# Patient Record
Sex: FEMALE | Race: WHITE | NOT HISPANIC OR LATINO | Employment: FULL TIME | URBAN - METROPOLITAN AREA
[De-identification: names, ages, dates, MRNs, and addresses within clinical notes are randomized per-mention and may not be internally consistent; named-entity substitution may affect disease eponyms.]

---

## 2023-05-04 LAB
CLUE CELLS: NORMAL
NUGENT SCORE: 1
YEAST: NORMAL

## 2023-06-12 LAB
ANION GAP IN SER/PLAS: 12 MMOL/L (ref 10–20)
CALCIUM (MG/DL) IN SER/PLAS: 8.9 MG/DL (ref 8.6–10.3)
CARBON DIOXIDE, TOTAL (MMOL/L) IN SER/PLAS: 25 MMOL/L (ref 21–32)
CHLORIDE (MMOL/L) IN SER/PLAS: 103 MMOL/L (ref 98–107)
CHORIOGONADOTROPIN (MIU/ML) IN SER/PLAS: <2 MIU/ML
CREATININE (MG/DL) IN SER/PLAS: 0.72 MG/DL (ref 0.5–1.05)
ERYTHROCYTE DISTRIBUTION WIDTH (RATIO) BY AUTOMATED COUNT: 11.3 % (ref 11.5–14.5)
ERYTHROCYTE MEAN CORPUSCULAR HEMOGLOBIN CONCENTRATION (G/DL) BY AUTOMATED: 34 G/DL (ref 32–36)
ERYTHROCYTE MEAN CORPUSCULAR VOLUME (FL) BY AUTOMATED COUNT: 81 FL (ref 80–100)
ERYTHROCYTES (10*6/UL) IN BLOOD BY AUTOMATED COUNT: 4.84 X10E12/L (ref 4–5.2)
GFR FEMALE: >90 ML/MIN/1.73M2
GLUCOSE (MG/DL) IN SER/PLAS: 93 MG/DL (ref 74–99)
HEMATOCRIT (%) IN BLOOD BY AUTOMATED COUNT: 39.4 % (ref 36–46)
HEMOGLOBIN (G/DL) IN BLOOD: 13.4 G/DL (ref 12–16)
INR IN PPP BY COAGULATION ASSAY: 1 (ref 0.9–1.1)
LEUKOCYTES (10*3/UL) IN BLOOD BY AUTOMATED COUNT: 7.5 X10E9/L (ref 4.4–11.3)
NRBC (PER 100 WBCS) BY AUTOMATED COUNT: 0 /100 WBC (ref 0–0)
PLATELETS (10*3/UL) IN BLOOD AUTOMATED COUNT: 299 X10E9/L (ref 150–450)
POTASSIUM (MMOL/L) IN SER/PLAS: 3.9 MMOL/L (ref 3.5–5.3)
PROTHROMBIN TIME (PT) IN PPP BY COAGULATION ASSAY: 11.5 SEC (ref 9.8–13.4)
SODIUM (MMOL/L) IN SER/PLAS: 136 MMOL/L (ref 136–145)
THYROTROPIN (MIU/L) IN SER/PLAS BY DETECTION LIMIT <= 0.05 MIU/L: <0.01 MIU/L (ref 0.44–3.98)
UREA NITROGEN (MG/DL) IN SER/PLAS: 15 MG/DL (ref 6–23)

## 2023-06-15 ENCOUNTER — HOSPITAL ENCOUNTER (OUTPATIENT)
Dept: DATA CONVERSION | Facility: HOSPITAL | Age: 28
End: 2023-06-16
Attending: INTERNAL MEDICINE | Admitting: INTERNAL MEDICINE
Payer: COMMERCIAL

## 2023-06-15 DIAGNOSIS — E66.3 OVERWEIGHT: ICD-10-CM

## 2023-06-15 DIAGNOSIS — I47.10 SUPRAVENTRICULAR TACHYCARDIA (CMS-HCC): ICD-10-CM

## 2023-06-15 DIAGNOSIS — I49.1 ATRIAL PREMATURE DEPOLARIZATION: ICD-10-CM

## 2023-06-15 DIAGNOSIS — E05.90 THYROTOXICOSIS, UNSPECIFIED WITHOUT THYROTOXIC CRISIS OR STORM: ICD-10-CM

## 2023-06-15 DIAGNOSIS — Z87.891 PERSONAL HISTORY OF NICOTINE DEPENDENCE: ICD-10-CM

## 2023-06-15 LAB
ATRIAL RATE: 88 BPM
P AXIS: 26 DEGREES
P OFFSET: 202 MS
P ONSET: 157 MS
PR INTERVAL: 94 MS
Q ONSET: 204 MS
QRS COUNT: 15 BEATS
QRS DURATION: 110 MS
QT INTERVAL: 400 MS
QTC CALCULATION(BAZETT): 484 MS
QTC FREDERICIA: 454 MS
R AXIS: 64 DEGREES
T AXIS: 24 DEGREES
T OFFSET: 404 MS
VENTRICULAR RATE: 88 BPM

## 2023-06-16 ENCOUNTER — TELEPHONE (OUTPATIENT)
Dept: PRIMARY CARE | Facility: CLINIC | Age: 28
End: 2023-06-16
Payer: COMMERCIAL

## 2023-06-20 PROBLEM — E05.90 HYPERTHYROIDISM: Status: ACTIVE | Noted: 2023-06-20

## 2023-06-20 PROBLEM — M54.50 LOWER BACK PAIN: Status: ACTIVE | Noted: 2023-06-20

## 2023-06-20 PROBLEM — E53.8 VITAMIN B 12 DEFICIENCY: Status: ACTIVE | Noted: 2023-06-20

## 2023-06-20 PROBLEM — R91.1 LUNG NODULE: Status: ACTIVE | Noted: 2023-06-20

## 2023-06-20 PROBLEM — N91.2 AMENORRHEA: Status: ACTIVE | Noted: 2023-06-20

## 2023-06-20 PROBLEM — E04.9 THYROID GOITER: Status: ACTIVE | Noted: 2023-06-20

## 2023-06-20 PROBLEM — B97.7 HPV IN FEMALE: Status: ACTIVE | Noted: 2023-06-20

## 2023-06-20 PROBLEM — I49.1 PAC (PREMATURE ATRIAL CONTRACTION): Status: ACTIVE | Noted: 2023-06-20

## 2023-06-20 PROBLEM — N89.8 VAGINAL ITCHING: Status: ACTIVE | Noted: 2023-06-20

## 2023-06-20 PROBLEM — M54.9 BACK PAIN WITHOUT RADICULOPATHY: Status: ACTIVE | Noted: 2023-06-20

## 2023-06-20 PROBLEM — E05.00 GRAVES' DISEASE IN REMISSION: Status: ACTIVE | Noted: 2023-06-20

## 2023-06-20 PROBLEM — F41.9 ANXIETY DISORDER: Status: ACTIVE | Noted: 2023-06-20

## 2023-06-20 PROBLEM — D64.9 ANEMIA: Status: ACTIVE | Noted: 2023-06-20

## 2023-06-20 PROBLEM — R94.31 ABNORMAL ELECTROCARDIOGRAPHY: Status: ACTIVE | Noted: 2023-06-20

## 2023-06-20 PROBLEM — S33.5XXA LUMBAR SPRAIN: Status: ACTIVE | Noted: 2023-06-20

## 2023-06-20 PROBLEM — R07.89 CHEST DISCOMFORT: Status: ACTIVE | Noted: 2023-06-20

## 2023-06-20 PROBLEM — R00.2 PALPITATIONS: Status: ACTIVE | Noted: 2023-06-20

## 2023-06-20 PROBLEM — I47.10 SUPRAVENTRICULAR TACHYCARDIA BY ECG (CMS-HCC): Status: ACTIVE | Noted: 2023-06-20

## 2023-06-20 PROBLEM — R87.619 ABNORMAL PAP SMEAR OF CERVIX: Status: ACTIVE | Noted: 2023-06-20

## 2023-06-20 PROBLEM — R87.622 LGSIL PAP SMEAR OF VAGINA: Status: ACTIVE | Noted: 2023-06-20

## 2023-06-20 PROBLEM — I47.10 PAROXYSMAL SVT (SUPRAVENTRICULAR TACHYCARDIA) (CMS-HCC): Status: ACTIVE | Noted: 2023-06-20

## 2023-06-20 PROBLEM — R06.02 SHORTNESS OF BREATH: Status: ACTIVE | Noted: 2023-06-20

## 2023-06-20 PROBLEM — E55.9 VITAMIN D DEFICIENCY: Status: ACTIVE | Noted: 2023-06-20

## 2023-06-20 PROBLEM — I47.11 INAPPROPRIATE SINUS TACHYCARDIA (CMS-HCC): Status: ACTIVE | Noted: 2023-06-20

## 2023-06-20 PROBLEM — E61.1 IRON DEFICIENCY: Status: ACTIVE | Noted: 2023-06-20

## 2023-06-20 PROBLEM — B00.1 COLD SORE: Status: ACTIVE | Noted: 2023-06-20

## 2023-06-20 PROBLEM — T14.8XXA HEMATOMA: Status: ACTIVE | Noted: 2023-06-20

## 2023-06-20 PROBLEM — R51.9 HEADACHE: Status: ACTIVE | Noted: 2023-06-20

## 2023-06-20 PROBLEM — S90.30XA FOOT CONTUSION: Status: ACTIVE | Noted: 2023-06-20

## 2023-06-20 PROBLEM — N76.0 VAGINITIS: Status: ACTIVE | Noted: 2023-06-20

## 2023-06-20 PROBLEM — E05.00 GRAVES' OPHTHALMOPATHY: Status: ACTIVE | Noted: 2023-06-20

## 2023-06-20 PROBLEM — H10.89 OTHER CONJUNCTIVITIS: Status: ACTIVE | Noted: 2023-06-20

## 2023-06-20 PROBLEM — N89.8 VAGINAL ODOR: Status: ACTIVE | Noted: 2023-06-20

## 2023-06-21 ENCOUNTER — LAB (OUTPATIENT)
Dept: LAB | Facility: LAB | Age: 28
End: 2023-06-21
Payer: COMMERCIAL

## 2023-06-21 ENCOUNTER — OFFICE VISIT (OUTPATIENT)
Dept: PRIMARY CARE | Facility: CLINIC | Age: 28
End: 2023-06-21
Payer: COMMERCIAL

## 2023-06-21 VITALS
WEIGHT: 171 LBS | OXYGEN SATURATION: 98 % | HEART RATE: 89 BPM | DIASTOLIC BLOOD PRESSURE: 85 MMHG | TEMPERATURE: 98.6 F | BODY MASS INDEX: 26 KG/M2 | SYSTOLIC BLOOD PRESSURE: 126 MMHG

## 2023-06-21 DIAGNOSIS — E61.1 IRON DEFICIENCY: ICD-10-CM

## 2023-06-21 DIAGNOSIS — E55.9 VITAMIN D DEFICIENCY: ICD-10-CM

## 2023-06-21 DIAGNOSIS — E05.00 GRAVES DISEASE: ICD-10-CM

## 2023-06-21 DIAGNOSIS — E05.00 GRAVES DISEASE: Primary | ICD-10-CM

## 2023-06-21 LAB
ALANINE AMINOTRANSFERASE (SGPT) (U/L) IN SER/PLAS: 8 U/L (ref 7–45)
ALBUMIN (G/DL) IN SER/PLAS: 4.2 G/DL (ref 3.4–5)
ALKALINE PHOSPHATASE (U/L) IN SER/PLAS: 49 U/L (ref 33–110)
ASPARTATE AMINOTRANSFERASE (SGOT) (U/L) IN SER/PLAS: 14 U/L (ref 9–39)
BILIRUBIN DIRECT (MG/DL) IN SER/PLAS: 0.1 MG/DL (ref 0–0.3)
BILIRUBIN TOTAL (MG/DL) IN SER/PLAS: 0.3 MG/DL (ref 0–1.2)
CALCIDIOL (25 OH VITAMIN D3) (NG/ML) IN SER/PLAS: 23 NG/ML
IRON (UG/DL) IN SER/PLAS: 41 UG/DL (ref 35–150)
IRON BINDING CAPACITY (UG/DL) IN SER/PLAS: 438 UG/DL (ref 240–445)
IRON SATURATION (%) IN SER/PLAS: 9 % (ref 25–45)
PROTEIN TOTAL: 7 G/DL (ref 6.4–8.2)
THYROTROPIN (MIU/L) IN SER/PLAS BY DETECTION LIMIT <= 0.05 MIU/L: 0.06 MIU/L (ref 0.44–3.98)
THYROXINE (T4) FREE (NG/DL) IN SER/PLAS: 0.59 NG/DL (ref 0.61–1.12)

## 2023-06-21 PROCEDURE — 82306 VITAMIN D 25 HYDROXY: CPT

## 2023-06-21 PROCEDURE — 84481 FREE ASSAY (FT-3): CPT

## 2023-06-21 PROCEDURE — 1036F TOBACCO NON-USER: CPT | Performed by: INTERNAL MEDICINE

## 2023-06-21 PROCEDURE — 83550 IRON BINDING TEST: CPT

## 2023-06-21 PROCEDURE — 80076 HEPATIC FUNCTION PANEL: CPT

## 2023-06-21 PROCEDURE — 3008F BODY MASS INDEX DOCD: CPT | Performed by: INTERNAL MEDICINE

## 2023-06-21 PROCEDURE — 84432 ASSAY OF THYROGLOBULIN: CPT

## 2023-06-21 PROCEDURE — 86800 THYROGLOBULIN ANTIBODY: CPT

## 2023-06-21 PROCEDURE — 84439 ASSAY OF FREE THYROXINE: CPT

## 2023-06-21 PROCEDURE — 83540 ASSAY OF IRON: CPT

## 2023-06-21 PROCEDURE — 82728 ASSAY OF FERRITIN: CPT

## 2023-06-21 PROCEDURE — 99214 OFFICE O/P EST MOD 30 MIN: CPT | Performed by: INTERNAL MEDICINE

## 2023-06-21 PROCEDURE — 84443 ASSAY THYROID STIM HORMONE: CPT

## 2023-06-21 PROCEDURE — 36415 COLL VENOUS BLD VENIPUNCTURE: CPT

## 2023-06-21 RX ORDER — DESOGESTREL AND ETHINYL ESTRADIOL 0.15-0.03
1 KIT ORAL DAILY
COMMUNITY
End: 2024-03-26 | Stop reason: WASHOUT

## 2023-06-21 RX ORDER — ACETAMINOPHEN 325 MG/1
1000 TABLET ORAL EVERY 8 HOURS PRN
COMMUNITY
Start: 2022-02-19 | End: 2023-11-28 | Stop reason: ALTCHOICE

## 2023-06-21 RX ORDER — ASPIRIN 81 MG/1
81 TABLET ORAL DAILY
COMMUNITY
End: 2023-11-28 | Stop reason: ALTCHOICE

## 2023-06-21 RX ORDER — METOPROLOL SUCCINATE 25 MG/1
1 TABLET, EXTENDED RELEASE ORAL DAILY
COMMUNITY
Start: 2022-04-01 | End: 2024-03-18 | Stop reason: SDUPTHER

## 2023-06-21 RX ORDER — LANOLIN ALCOHOL/MO/W.PET/CERES
1 CREAM (GRAM) TOPICAL DAILY
COMMUNITY
Start: 2021-12-07 | End: 2023-11-28 | Stop reason: SDUPTHER

## 2023-06-21 ASSESSMENT — PATIENT HEALTH QUESTIONNAIRE - PHQ9
2. FEELING DOWN, DEPRESSED OR HOPELESS: NOT AT ALL
SUM OF ALL RESPONSES TO PHQ9 QUESTIONS 1 AND 2: 0
1. LITTLE INTEREST OR PLEASURE IN DOING THINGS: NOT AT ALL

## 2023-06-21 NOTE — PROGRESS NOTES
Subjective   Patient ID: Mary Jackson is a 27 y.o. female who presents for Follow-up (Hosp follow up had ablation done/Concerned about TSH results/Not feeling any symptoms).  HPI  Ablation last week  Palpations  Hr 240  Last endo  Note reviewed  No cp now or sob  No more palpitations    Left upper lung nodule 3 years ago pt states she saw on an op report, we cannot find today,  last cxr ok   She will look let me know if she finds  Review of Systems  Gen:  no fever  HEENT:  no trouble swallowing  CV:  no dyspnea, cyanosis  Lungs:  no shortness of breath  GI:  no constipation, no blood in stool  Vascular:  no edema  Neuro:   no weakness  Skin:  no rash  MS:no joint swelling  Gu:  no urinary complaints  All other systems have been reviewed and are negative for complaint    /85   Pulse 89   Temp 37 °C (98.6 °F) (Temporal)   Wt 77.6 kg (171 lb)   SpO2 98%   BMI 26.00 kg/m²   Objective   Physical Exam  Lab Results   Component Value Date    WBC 7.5 06/12/2023    HGB 13.4 06/12/2023    HCT 39.4 06/12/2023    MCV 81 06/12/2023     06/12/2023     Lab Results   Component Value Date    GLUCOSE 93 06/12/2023    CALCIUM 8.9 06/12/2023     06/12/2023    K 3.9 06/12/2023    CO2 25 06/12/2023     06/12/2023    BUN 15 06/12/2023    CREATININE 0.72 06/12/2023     Social History     Socioeconomic History    Marital status: Single     Spouse name: None    Number of children: None    Years of education: None    Highest education level: None   Occupational History    None   Tobacco Use    Smoking status: Never    Smokeless tobacco: Never   Substance and Sexual Activity    Alcohol use: Yes    Drug use: None    Sexual activity: None   Other Topics Concern    None   Social History Narrative    None     Social Determinants of Health     Financial Resource Strain: Not on file   Food Insecurity: Not on file   Transportation Needs: Not on file   Physical Activity: Not on file   Stress: Not on file   Social  Connections: Not on file   Intimate Partner Violence: Not on file   Housing Stability: Not on file     No family history on file.      General Appearance:  Alert and oriented.  NAD  Lungs, CTAB  Skin:  no suspicious lesions,  warm and dry  Head :  Normocephalic  Neck/thyroid:  neck supple, full rom, no cervical lymphadenopathy  no thyromegaly  Heart:  RRR  no murmurs  Abdomen:  Normal , bs present, soft, nontender, not distended, no masses palpated  Extremities:  No clubbing, cyanosis, or edema  Neurologic:  Nonfocal  Psych: alert, normal mood    Problem List Items Addressed This Visit       Iron deficiency    Relevant Orders    Triiodothyronine, Free (Completed)    Thyroxine, Free (Completed)    Thyroid Stimulating Hormone (Completed)    Thyroglobulin and Antithyroglobulin    Vitamin D, Total (Completed)    Iron and TIBC (Completed)    Ferritin (Completed)    Hepatic Function Panel (Completed)    Vitamin D deficiency    Relevant Orders    Triiodothyronine, Free (Completed)    Thyroxine, Free (Completed)    Thyroid Stimulating Hormone (Completed)    Thyroglobulin and Antithyroglobulin    Vitamin D, Total (Completed)    Iron and TIBC (Completed)    Ferritin (Completed)    Hepatic Function Panel (Completed)     Other Visit Diagnoses       Graves disease    -  Primary    Relevant Orders    Triiodothyronine, Free (Completed)    Thyroxine, Free (Completed)    Thyroid Stimulating Hormone (Completed)    Thyroglobulin and Antithyroglobulin    Vitamin D, Total (Completed)    Iron and TIBC (Completed)    Ferritin (Completed)    Hepatic Function Panel (Completed)            Mary was seen today for follow-up.  Diagnoses and all orders for this visit:  Graves disease (Primary)  -     Triiodothyronine, Free; Future  -     Thyroxine, Free; Future  -     Thyroid Stimulating Hormone; Future  -     Thyroglobulin and Antithyroglobulin; Future  -     Vitamin D, Total; Future  -     Iron and TIBC; Future  -     Ferritin; Future  -      Hepatic Function Panel; Future  Vitamin D deficiency  -     Triiodothyronine, Free; Future  -     Thyroxine, Free; Future  -     Thyroid Stimulating Hormone; Future  -     Thyroglobulin and Antithyroglobulin; Future  -     Vitamin D, Total; Future  -     Iron and TIBC; Future  -     Ferritin; Future  -     Hepatic Function Panel; Future  Iron deficiency  -     Triiodothyronine, Free; Future  -     Thyroxine, Free; Future  -     Thyroid Stimulating Hormone; Future  -     Thyroglobulin and Antithyroglobulin; Future  -     Vitamin D, Total; Future  -     Iron and TIBC; Future  -     Ferritin; Future  -     Hepatic Function Panel; Future

## 2023-06-22 LAB
FERRITIN (UG/LL) IN SER/PLAS: 48 UG/L (ref 8–150)
POC ACTIVATED CLOTTING TIME LOW RANGE: 200 SECONDS (ref 89–169)
POC ACTIVATED CLOTTING TIME LOW RANGE: 226 SECONDS (ref 89–169)
POC ACTIVATED CLOTTING TIME LOW RANGE: 294 SECONDS (ref 89–169)
POC ACTIVATED CLOTTING TIME LOW RANGE: 300 SECONDS (ref 89–169)
TRIIODOTHYRONINE (T3) FREE (PG/ML) IN SER/PLAS: 2.8 PG/ML (ref 2.3–4.2)

## 2023-06-23 ENCOUNTER — TELEPHONE (OUTPATIENT)
Dept: PRIMARY CARE | Facility: CLINIC | Age: 28
End: 2023-06-23
Payer: COMMERCIAL

## 2023-06-26 DIAGNOSIS — E55.9 VITAMIN D DEFICIENCY: ICD-10-CM

## 2023-06-26 DIAGNOSIS — E61.1 IRON DEFICIENCY: ICD-10-CM

## 2023-06-27 LAB
THYROGLOBULIN AB (IU/ML) IN SER/PLAS: 59.9 IU/ML (ref 0–4)
THYROGLOBULIN LC-MS/MS: 2.1 NG/ML (ref 1.3–31.8)
THYROGLOBULIN: ABNORMAL NG/ML (ref 1.3–31.8)

## 2023-06-27 RX ORDER — ERGOCALCIFEROL 1.25 MG/1
50000 CAPSULE ORAL
Qty: 8 CAPSULE | Refills: 0 | Status: SHIPPED | OUTPATIENT
Start: 2023-06-27 | End: 2023-08-22

## 2023-07-25 DIAGNOSIS — T14.8XXA HEMATOMA: Primary | ICD-10-CM

## 2023-08-28 PROBLEM — S99.921A RIGHT FOOT INJURY, INITIAL ENCOUNTER: Status: ACTIVE | Noted: 2023-06-20

## 2023-08-28 RX ORDER — LANOLIN ALCOHOL/MO/W.PET/CERES
1 CREAM (GRAM) TOPICAL 2 TIMES DAILY
COMMUNITY
Start: 2021-12-07 | End: 2024-03-18 | Stop reason: SDUPTHER

## 2023-08-28 RX ORDER — METHIMAZOLE 10 MG/1
1 TABLET ORAL DAILY
COMMUNITY
Start: 2018-10-02 | End: 2023-11-28 | Stop reason: ALTCHOICE

## 2023-08-28 RX ORDER — BISACODYL 5 MG/1
TABLET, COATED ORAL EVERY 24 HOURS
COMMUNITY
Start: 2022-04-05 | End: 2023-11-28 | Stop reason: ALTCHOICE

## 2023-08-28 RX ORDER — METOPROLOL TARTRATE 25 MG/1
1 TABLET, FILM COATED ORAL 2 TIMES DAILY
COMMUNITY
Start: 2021-12-07

## 2023-08-28 RX ORDER — ASPIRIN 325 MG
TABLET, DELAYED RELEASE (ENTERIC COATED) ORAL
COMMUNITY
Start: 2020-09-10 | End: 2023-11-28 | Stop reason: ALTCHOICE

## 2023-09-07 VITALS — BODY MASS INDEX: 26.85 KG/M2 | HEIGHT: 67 IN | WEIGHT: 171.08 LBS

## 2023-09-08 PROBLEM — R10.30 GROIN PAIN: Status: ACTIVE | Noted: 2023-09-08

## 2023-09-08 PROBLEM — S30.1XXA HEMATOMA OF GROIN: Status: ACTIVE | Noted: 2023-09-08

## 2023-09-08 PROBLEM — R87.612 LGSIL ON PAP SMEAR OF CERVIX: Status: ACTIVE | Noted: 2023-09-08

## 2023-09-08 RX ORDER — SERTRALINE HYDROCHLORIDE 50 MG/1
1 TABLET, FILM COATED ORAL DAILY
COMMUNITY
Start: 2023-09-05 | End: 2023-12-08 | Stop reason: SDUPTHER

## 2023-09-30 NOTE — DISCHARGE SUMMARY
Send Summary:   Discharge Summary Providers:  Provider Role Provider Name   · Attending Mireille Montiel   · Referring Mireille Montiel   · Primary Gisela Bowman       Note Recipients: Gisela Bowman MD Quan, Kara, MD - [phone] [preferred]       Discharge:    Summary:   Admission Date: .15-Clayton-2023 10:55:00   Discharge Date: 16-Jun-2023   Attending Physician at Discharge: Mireille Montiel   Admission Reason: PSVT   Final Discharge Diagnoses: Paroxysmal supraventricular  tachycardia   Procedures: Date: 15-Clayton-2023 18:21:00  Procedure Name: Ablation of SVT x 2, Pacing after IV medication, 3D mapping, Arterial line,Vascular ultrasound guided access x 3   Condition at Discharge: Satisfactory   Disposition at Discharge: .Home   Vital Signs:        T   P  R  BP   MAP  SpO2   Value  36.5  97  16  128/77   97  97%  Date/Time 6/16 7:35 6/16 7:35 6/16 7:35 6/16 7:35  6/16 7:35 6/16 7:35  Range  (35.8C - 36.5C )  (86 - 98 )  (16 - 17 )  (122 - 128 )/ (77 - 91 )  (97 - 99 )  (96% - 97% )    Date:            Weight/Scale Type:  Height:   15-Clayton-2023 22:20  77.6  kg         170.1  cm  Hospital Course:    7-year-old female admitted to [facility] with PSVT for elective EP study possible ablation.  Patient underwent left-sided ablation  with 2 accessory pathways. See full operative report per Dr. Montiel.  Patient was monitored overnight secondary to anesthesia and length of procedure.  At this time patient is up out of bed ambulating in room.  She remains sinus rhythm on telemetry.  Right groin site dressing was removed no hematoma or bleeding at site.  Patient complains of minor discomfort at site, pain relief with Tylenol or tramadol.  Post procedure potential complication, activity restrictions, medications, and follow up reviewed with patient.  Aspirin 325 mg ordered daily x1 month.  Okay to discharge home.  Outpatient follow-up with Holter monitor in 8 weeks then Dr Mireille Montiel in  12  weeks.      Total visit time 25 minutes      Discharge Information:    and Continuing Care:   Lab Results - Pending:    None  Radiology Results - Pending: None   Discharge Instructions:    .  Follow Up Appointments:    Follow-Up Appointment 01:           Physician/Dept/Service:   [facility]          Reason for Referral:   Holter Monitor          Scheduled Date/Time:   11-Aug-2023 10:00          Location:   [facility]          Phone Number:   [phone]    Follow-Up Appointment 02:           Physician/Dept/Service:   Dr Montiel          Reason for Referral:   Follow-up/Holter Follow-up          Scheduled Date/Time:   18-Sep-2023 09:40          Location:   [facility]          Phone Number:   [phone]    Discharge Medications: Home Medication   Apri 0.15 mg-0.03 mg oral tablet - 1 tab(s) orally once a day  magnesium oxide 400 mg oral tablet - 1 tab(s) orally 2 times a day  Metoprolol Succinate ER 25 mg oral tablet, extended release - 1 tab(s) orally once a day  aspirin 325 mg oral delayed release tablet - 1 tab(s) orally once a day.  for 4 weeks then discontinue     PRN Medication   acyclovir 400 mg oral tablet - 1 tab(s) orally 3 times a day for 5 days  Metoprolol Tartrate 25 mg oral tablet - 1 tab(s) orally 2 times a day, As Needed       DNR Status:   ·  Code Status Code Status order at time of discharge: Full Code     Attestation:   Note Completion:  I am a:  Advanced Practice Provider   Attending Only - Shared Visit with Advanced Practice Provider This is a shared visit.  I have reviewed the Advanced Practice Provider?s encounter note, approve the Advanced Practice Provider?s documentation,  and provide the following additional information from my personal encounter.    Comments/ Additional Findings    I have personally reviewed the data.    In addition,  The patient feels  ok  today and is ready for discharge.    Exam  VS reviewed.  Cor    Regular  Lungs  CTA     Personally reviewed ECG and tele    Tele  No SVT    Impression  PSVT, s/p ablation of two accessory pathways (left lateral and left anterolateral)  s/p remote ablation to prevent atypical AVNRT  OK to start ASA  Meds per MAR  Outpt Ep followup  Greater than 50% visit spent to discuss arrhythmia, tachycardia, accessory connections, treatment options, and management plan.          Electronic Signatures:  Elvira Mitchell (APRN-CNP)  (Signed 16-Jun-2023 09:49)   Authored: Send Summary, Summary Content, Ongoing Care,  DNR Status, Note Completion  Mireille Montiel)  (Signed 17-Jun-2023 11:21)   Authored: Ongoing Care, Note Completion   Co-Signer: Send Summary, Summary Content, Ongoing Care, DNR Status, Note Completion      Last Updated: 17-Jun-2023 11:21 by Mireille Montiel)

## 2023-09-30 NOTE — H&P
"    History & Physical Reviewed:   Pregnant/Lactating:  ·  Are You Pregnant no (1)   ·  Are You Currently Breastfeeding no (1)     I have reviewed the History and Physical dated:  22-May-2023   History and Physical reviewed and relevant findings noted. Patient examined to review pertinent physical  findings.: Significant findings noted below   Findings: She inquires about prior lung nodule.  I reviewed CXR from Jan 2023 with no active cardiac or pulmonary findings.  We also reviewed bloodwork, and copy of bloodwork sent to PCP.    She is anxious about procedure.  Will request anesthesiology coverage for procedure   Home Medications Reviewed: no changes noted   Allergies Reviewed: no changes noted       Airway/Sedation Assessment:  ·  Emotional Status anxious   ·  Neurologic alert & oriented x 3   ·  Respiratory clear to auscultation   ·  Cardiovascular rhythm & rate regular   ·  GI/ soft, nontender     · Pulses present: Pedal Left, Pedal Right, Radial Left, Radial Right     ·  Mouth Opening OK yes   ·  Neck Flexibility OK yes   ·  Loose Teeth no   ·  Oropharyngeal Classification Class II   ·  ASA PS Classification ASA III   ·  Sedation Plan moderate sedation     Consent:   COVID-19 Consent:  ·  COVID-19 Risk Consent Surgeon has reviewed key risks related to the risk of rafael COVID-19 and if they contract COVID-19 what the risks are.       Electronic Signatures:  Mireille Montiel)  (Signed 15-Clayton-2023 15:00)   Authored: History & Physical Reviewed, Airway/Sedation,  Consent, Note Completion      Last Updated: 15-Clayton-2023 15:00 by Mireille Montiel)    References:  1.  Data Referenced From \"Patient Profile - Preop v3\" 15-Clayton-2023 11:23   "

## 2023-11-14 ENCOUNTER — APPOINTMENT (OUTPATIENT)
Dept: CARDIOLOGY | Facility: CLINIC | Age: 28
End: 2023-11-14
Payer: COMMERCIAL

## 2023-11-28 ENCOUNTER — HOSPITAL ENCOUNTER (OUTPATIENT)
Dept: CARDIOLOGY | Facility: HOSPITAL | Age: 28
Discharge: HOME | End: 2023-11-28
Payer: COMMERCIAL

## 2023-11-28 ENCOUNTER — LAB (OUTPATIENT)
Dept: LAB | Facility: LAB | Age: 28
End: 2023-11-28
Payer: COMMERCIAL

## 2023-11-28 ENCOUNTER — OFFICE VISIT (OUTPATIENT)
Dept: PRIMARY CARE | Facility: CLINIC | Age: 28
End: 2023-11-28
Payer: COMMERCIAL

## 2023-11-28 ENCOUNTER — TELEPHONE (OUTPATIENT)
Dept: PRIMARY CARE | Facility: CLINIC | Age: 28
End: 2023-11-28

## 2023-11-28 VITALS
TEMPERATURE: 97.3 F | SYSTOLIC BLOOD PRESSURE: 122 MMHG | OXYGEN SATURATION: 98 % | WEIGHT: 186 LBS | DIASTOLIC BLOOD PRESSURE: 82 MMHG | HEART RATE: 80 BPM | BODY MASS INDEX: 28.28 KG/M2

## 2023-11-28 DIAGNOSIS — M79.606 PAIN OF LOWER EXTREMITY, UNSPECIFIED LATERALITY: ICD-10-CM

## 2023-11-28 DIAGNOSIS — R60.9 EDEMA, UNSPECIFIED TYPE: ICD-10-CM

## 2023-11-28 DIAGNOSIS — M79.604 PAIN OF RIGHT LOWER EXTREMITY: Primary | ICD-10-CM

## 2023-11-28 DIAGNOSIS — E05.00 GRAVES DISEASE: ICD-10-CM

## 2023-11-28 DIAGNOSIS — E55.9 VITAMIN D DEFICIENCY: ICD-10-CM

## 2023-11-28 DIAGNOSIS — E61.1 IRON DEFICIENCY: ICD-10-CM

## 2023-11-28 DIAGNOSIS — M79.604 PAIN OF RIGHT LOWER EXTREMITY: ICD-10-CM

## 2023-11-28 DIAGNOSIS — M79.662 PAIN IN LEFT LOWER LEG: ICD-10-CM

## 2023-11-28 LAB
ALBUMIN SERPL BCP-MCNC: 4 G/DL (ref 3.4–5)
ALP SERPL-CCNC: 55 U/L (ref 33–110)
ALT SERPL W P-5'-P-CCNC: 14 U/L (ref 7–45)
ANION GAP SERPL CALC-SCNC: 13 MMOL/L (ref 10–20)
AST SERPL W P-5'-P-CCNC: 16 U/L (ref 9–39)
BASOPHILS # BLD AUTO: 0.07 X10*3/UL (ref 0–0.1)
BASOPHILS NFR BLD AUTO: 1 %
BILIRUB SERPL-MCNC: 0.3 MG/DL (ref 0–1.2)
BUN SERPL-MCNC: 13 MG/DL (ref 6–23)
CALCIUM SERPL-MCNC: 9.1 MG/DL (ref 8.6–10.3)
CHLORIDE SERPL-SCNC: 102 MMOL/L (ref 98–107)
CO2 SERPL-SCNC: 27 MMOL/L (ref 21–32)
CREAT SERPL-MCNC: 0.81 MG/DL (ref 0.5–1.05)
EOSINOPHIL # BLD AUTO: 0.36 X10*3/UL (ref 0–0.7)
EOSINOPHIL NFR BLD AUTO: 5.3 %
ERYTHROCYTE [DISTWIDTH] IN BLOOD BY AUTOMATED COUNT: 11.9 % (ref 11.5–14.5)
FERRITIN SERPL-MCNC: 10 NG/ML (ref 8–150)
GFR SERPL CREATININE-BSD FRML MDRD: >90 ML/MIN/1.73M*2
GLUCOSE SERPL-MCNC: 103 MG/DL (ref 74–99)
HCT VFR BLD AUTO: 37.8 % (ref 36–46)
HGB BLD-MCNC: 11.9 G/DL (ref 12–16)
IMM GRANULOCYTES # BLD AUTO: 0.02 X10*3/UL (ref 0–0.7)
IMM GRANULOCYTES NFR BLD AUTO: 0.3 % (ref 0–0.9)
IRON SATN MFR SERPL: 17 % (ref 25–45)
IRON SERPL-MCNC: 93 UG/DL (ref 35–150)
LYMPHOCYTES # BLD AUTO: 2.45 X10*3/UL (ref 1.2–4.8)
LYMPHOCYTES NFR BLD AUTO: 35.8 %
MCH RBC QN AUTO: 26.9 PG (ref 26–34)
MCHC RBC AUTO-ENTMCNC: 31.5 G/DL (ref 32–36)
MCV RBC AUTO: 86 FL (ref 80–100)
MONOCYTES # BLD AUTO: 0.37 X10*3/UL (ref 0.1–1)
MONOCYTES NFR BLD AUTO: 5.4 %
NEUTROPHILS # BLD AUTO: 3.58 X10*3/UL (ref 1.2–7.7)
NEUTROPHILS NFR BLD AUTO: 52.2 %
NRBC BLD-RTO: 0 /100 WBCS (ref 0–0)
PLATELET # BLD AUTO: 289 X10*3/UL (ref 150–450)
POTASSIUM SERPL-SCNC: 3.9 MMOL/L (ref 3.5–5.3)
PROT SERPL-MCNC: 6.8 G/DL (ref 6.4–8.2)
RBC # BLD AUTO: 4.42 X10*6/UL (ref 4–5.2)
SODIUM SERPL-SCNC: 138 MMOL/L (ref 136–145)
T4 FREE SERPL-MCNC: 0.48 NG/DL (ref 0.61–1.12)
TIBC SERPL-MCNC: 536 UG/DL (ref 240–445)
TSH SERPL-ACNC: 0.99 MIU/L (ref 0.44–3.98)
UIBC SERPL-MCNC: 443 UG/DL (ref 110–370)
WBC # BLD AUTO: 6.9 X10*3/UL (ref 4.4–11.3)

## 2023-11-28 PROCEDURE — 82728 ASSAY OF FERRITIN: CPT

## 2023-11-28 PROCEDURE — 80053 COMPREHEN METABOLIC PANEL: CPT

## 2023-11-28 PROCEDURE — 84481 FREE ASSAY (FT-3): CPT

## 2023-11-28 PROCEDURE — 36415 COLL VENOUS BLD VENIPUNCTURE: CPT

## 2023-11-28 PROCEDURE — 85025 COMPLETE CBC W/AUTO DIFF WBC: CPT

## 2023-11-28 PROCEDURE — 83550 IRON BINDING TEST: CPT

## 2023-11-28 PROCEDURE — 83540 ASSAY OF IRON: CPT

## 2023-11-28 PROCEDURE — 3008F BODY MASS INDEX DOCD: CPT | Performed by: INTERNAL MEDICINE

## 2023-11-28 PROCEDURE — 84439 ASSAY OF FREE THYROXINE: CPT

## 2023-11-28 PROCEDURE — 93971 EXTREMITY STUDY: CPT

## 2023-11-28 PROCEDURE — 82306 VITAMIN D 25 HYDROXY: CPT

## 2023-11-28 PROCEDURE — 1036F TOBACCO NON-USER: CPT | Performed by: INTERNAL MEDICINE

## 2023-11-28 PROCEDURE — 99213 OFFICE O/P EST LOW 20 MIN: CPT | Performed by: INTERNAL MEDICINE

## 2023-11-28 PROCEDURE — 84443 ASSAY THYROID STIM HORMONE: CPT

## 2023-11-28 PROCEDURE — 86376 MICROSOMAL ANTIBODY EACH: CPT

## 2023-11-28 NOTE — PROGRESS NOTES
Subjective   Patient ID: Mary Jackson is a 28 y.o. female who presents for Bleeding/Bruising (Bruising on right leg/painful noticed Sunday evening/).  HPII  Hematoma  after ablation in July Sunday pain, swelling, bruising 2 days ago  No trauma  Sore, bruising  No sob  No cough     Review of Systems  Gen:  no fever  HEENT:  no trouble swallowing  CV:  no dyspnea, cyanosis  Lungs:  no shortness of breath  GI:  no constipation, no blood in stool   Neuro:   no weakness  Skin:  no rash  MS:no joint swelling  Gu:  no urinary complaints  All other systems have been reviewed and are negative for complaint    /89   Pulse 80   Temp 36.3 °C (97.3 °F) (Temporal)   Wt 84.4 kg (186 lb)   SpO2 98%   BMI 28.28 kg/m²   Objective   Physical Exam  Lab Results   Component Value Date    WBC 7.5 07/26/2023    HGB 12.1 07/26/2023    HCT 36.4 07/26/2023    MCV 83 07/26/2023     07/26/2023     Lab Results   Component Value Date    GLUCOSE 96 07/26/2023    CALCIUM 9.0 07/26/2023     (L) 07/26/2023    K 3.8 07/26/2023    CO2 25 07/26/2023     07/26/2023    BUN 11 07/26/2023    CREATININE 0.70 07/26/2023     Social History     Socioeconomic History    Marital status: Single     Spouse name: None    Number of children: None    Years of education: None    Highest education level: None   Occupational History    None   Tobacco Use    Smoking status: Former     Types: Cigarettes    Smokeless tobacco: Never   Substance and Sexual Activity    Alcohol use: Yes    Drug use: None    Sexual activity: None   Other Topics Concern    None   Social History Narrative    None     Social Determinants of Health     Financial Resource Strain: Not on file   Food Insecurity: Not on file   Transportation Needs: Not on file   Physical Activity: Not on file   Stress: Not on file   Social Connections: Not on file   Intimate Partner Violence: Not on file   Housing Stability: Not on file     Family History   Problem Relation Name Age of  Onset    Anxiety disorder Mother      Thyroid disease Other Paternal GGM     Thyroid disease Other Cousin        General:  Alert and in  NAD  Lungs, CTAB  Skin:  no suspicious lesions,  warm and dry  Head :  Normocephalic  Neck/thyroid:  neck supple, full rom, no cervical lymphadenopathy  no thyromegaly  Heart:  RRR  no murmurs  Abdomen:  Normal , bs present, soft, nontender, not distended, no masses palpated  Extremities:  No clubbing, cyanosis, or edema except r le large bruise extending down inner thigh to below knee   Nl le pulse   Neurologic:  Nonfocal  Psych: alert, normal mood    Problem List Items Addressed This Visit    None  Visit Diagnoses         Codes    Pain of right lower extremity    -  Primary M79.604    Relevant Orders    CBC and Auto Differential    Comprehensive Metabolic Panel (Completed)    Edema, unspecified type     R60.9    Relevant Orders    Vascular US Lower Extremity Venous Duplex Right (Completed)    CBC and Auto Differential    Comprehensive Metabolic Panel (Completed)    Pain of lower extremity, unspecified laterality     M79.606    Relevant Orders    Vascular US Lower Extremity Venous Duplex Right (Completed)    CBC and Auto Differential    Comprehensive Metabolic Panel (Completed)           Follow up if symptoms do not resolve or worsen.  Tx like thrombophlebitis

## 2023-11-28 NOTE — TELEPHONE ENCOUNTER
Pt called in for appt regarding rt leg pain-offered appt tomorrow, pt is only available Thursday.

## 2023-11-29 DIAGNOSIS — E55.9 VITAMIN D DEFICIENCY: ICD-10-CM

## 2023-11-29 DIAGNOSIS — E61.1 IRON DEFICIENCY: ICD-10-CM

## 2023-11-29 LAB
25(OH)D3 SERPL-MCNC: 23 NG/ML (ref 30–100)
T3FREE SERPL-MCNC: 3.1 PG/ML (ref 2.3–4.2)
THYROPEROXIDASE AB SERPL-ACNC: >1000 IU/ML

## 2023-11-29 RX ORDER — ERGOCALCIFEROL 1.25 MG/1
CAPSULE ORAL
Qty: 6 CAPSULE | Refills: 0 | Status: SHIPPED | OUTPATIENT
Start: 2023-11-29 | End: 2024-03-26 | Stop reason: WASHOUT

## 2023-11-30 ENCOUNTER — APPOINTMENT (OUTPATIENT)
Dept: PRIMARY CARE | Facility: CLINIC | Age: 28
End: 2023-11-30
Payer: COMMERCIAL

## 2023-12-08 DIAGNOSIS — F41.9 ANXIETY DISORDER, UNSPECIFIED TYPE: Primary | ICD-10-CM

## 2023-12-08 RX ORDER — SERTRALINE HYDROCHLORIDE 50 MG/1
50 TABLET, FILM COATED ORAL DAILY
Qty: 45 TABLET | Refills: 3 | Status: SHIPPED | OUTPATIENT
Start: 2023-12-08 | End: 2023-12-12 | Stop reason: SDUPTHER

## 2023-12-12 DIAGNOSIS — F41.9 ANXIETY DISORDER, UNSPECIFIED TYPE: ICD-10-CM

## 2023-12-12 RX ORDER — SERTRALINE HYDROCHLORIDE 50 MG/1
75 TABLET, FILM COATED ORAL DAILY
Qty: 45 TABLET | Refills: 11 | Status: SHIPPED | OUTPATIENT
Start: 2023-12-12 | End: 2024-03-26 | Stop reason: WASHOUT

## 2024-01-23 ENCOUNTER — OFFICE VISIT (OUTPATIENT)
Dept: OBSTETRICS AND GYNECOLOGY | Facility: CLINIC | Age: 29
End: 2024-01-23
Payer: COMMERCIAL

## 2024-01-23 VITALS
DIASTOLIC BLOOD PRESSURE: 86 MMHG | WEIGHT: 178.38 LBS | HEIGHT: 67 IN | SYSTOLIC BLOOD PRESSURE: 124 MMHG | BODY MASS INDEX: 28 KG/M2

## 2024-01-23 DIAGNOSIS — N92.0 MENORRHAGIA WITH REGULAR CYCLE: Primary | ICD-10-CM

## 2024-01-23 DIAGNOSIS — N92.6 IRREGULAR PERIODS/MENSTRUAL CYCLES: ICD-10-CM

## 2024-01-23 LAB — B-HCG SERPL-ACNC: <2 MIU/ML

## 2024-01-23 PROCEDURE — 99214 OFFICE O/P EST MOD 30 MIN: CPT

## 2024-01-23 PROCEDURE — 3008F BODY MASS INDEX DOCD: CPT

## 2024-01-23 PROCEDURE — 36415 COLL VENOUS BLD VENIPUNCTURE: CPT

## 2024-01-23 PROCEDURE — 84702 CHORIONIC GONADOTROPIN TEST: CPT

## 2024-01-23 PROCEDURE — 1036F TOBACCO NON-USER: CPT

## 2024-01-23 RX ORDER — NORETHINDRONE ACETATE AND ETHINYL ESTRADIOL 1.5-30(21)
1 KIT ORAL DAILY
Qty: 84 TABLET | Refills: 3 | Status: SHIPPED | OUTPATIENT
Start: 2024-01-23

## 2024-01-23 NOTE — PROGRESS NOTES
Patient here today for concerns of atypical menstrual bleeding and concerns for possible pregnancy.     Patient reports that her menses generally come with the placebo week and last 6-7 days. 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 Patient reports heavy bleeding during current menses where she was changing products every 1-1.5 hours for two days and reports passage of quarter to plum sized clots. Patient reports that bleeding abruptly tapered off  after two days to where she is only spotting now which is atypical for her.     Patient reports that she is sexually active and does not use condoms. Patient reports good pill compliance.     Patient is currently on Apri and has been for the last three years. Patient reports that heavier bleeding has occurred a few times over the last 6 months.     Physical Exam  Constitutional:       Appearance: Normal appearance.   Eyes:      Conjunctiva/sclera: Conjunctivae normal.   Pulmonary:      Effort: Pulmonary effort is normal.   Neurological:      Mental Status: She is alert.   Psychiatric:         Mood and Affect: Mood normal.      Review of Systems   Genitourinary:  Positive for menstrual problem.   All other systems reviewed and are negative.       Plan: quant HCG today. Discussed option of up-titrating OCP which patient is agreeable to. If patient continues to having heavier menstrual cycles will consider pelvic US.

## 2024-03-18 DIAGNOSIS — R00.2 PALPITATIONS: Primary | ICD-10-CM

## 2024-03-18 RX ORDER — LANOLIN ALCOHOL/MO/W.PET/CERES
1 CREAM (GRAM) TOPICAL 2 TIMES DAILY
Qty: 180 TABLET | Refills: 1 | Status: SHIPPED | OUTPATIENT
Start: 2024-03-18

## 2024-03-18 RX ORDER — METOPROLOL SUCCINATE 25 MG/1
25 TABLET, EXTENDED RELEASE ORAL DAILY
Qty: 90 TABLET | Refills: 1 | Status: SHIPPED | OUTPATIENT
Start: 2024-03-18

## 2024-03-18 NOTE — TELEPHONE ENCOUNTER
----- Message from Mary Jackson sent at 3/18/2024 11:52 AM EDT -----  Regarding: Refill  Contact: [phone]  Can I please get refills sent in of my Metoprolol Succinate 25mg? I have enough for this week but not after. Also need Magnesium Oxide 400mg sent in. Thank you very much! Pharmacy is on file, [facility]!

## 2024-03-26 DIAGNOSIS — F32.A ANXIETY AND DEPRESSION: Primary | ICD-10-CM

## 2024-03-26 DIAGNOSIS — F41.9 ANXIETY AND DEPRESSION: Primary | ICD-10-CM

## 2024-03-26 RX ORDER — ESCITALOPRAM OXALATE 10 MG/1
10 TABLET ORAL DAILY
Qty: 30 TABLET | Refills: 11 | Status: SHIPPED | OUTPATIENT
Start: 2024-03-26 | End: 2025-03-21

## 2024-04-02 ENCOUNTER — APPOINTMENT (OUTPATIENT)
Dept: RADIOLOGY | Facility: HOSPITAL | Age: 29
End: 2024-04-02
Payer: COMMERCIAL

## 2024-04-02 ENCOUNTER — HOSPITAL ENCOUNTER (EMERGENCY)
Facility: HOSPITAL | Age: 29
Discharge: HOME | End: 2024-04-02
Attending: STUDENT IN AN ORGANIZED HEALTH CARE EDUCATION/TRAINING PROGRAM
Payer: COMMERCIAL

## 2024-04-02 ENCOUNTER — APPOINTMENT (OUTPATIENT)
Dept: CARDIOLOGY | Facility: HOSPITAL | Age: 29
End: 2024-04-02
Payer: COMMERCIAL

## 2024-04-02 VITALS
TEMPERATURE: 97.5 F | HEART RATE: 83 BPM | RESPIRATION RATE: 18 BRPM | SYSTOLIC BLOOD PRESSURE: 127 MMHG | WEIGHT: 185 LBS | DIASTOLIC BLOOD PRESSURE: 86 MMHG | HEIGHT: 66 IN | OXYGEN SATURATION: 98 % | BODY MASS INDEX: 29.73 KG/M2

## 2024-04-02 DIAGNOSIS — R07.9 CHEST PAIN, UNSPECIFIED TYPE: Primary | ICD-10-CM

## 2024-04-02 LAB
ALBUMIN SERPL BCP-MCNC: 3.9 G/DL (ref 3.4–5)
ALP SERPL-CCNC: 58 U/L (ref 33–110)
ALT SERPL W P-5'-P-CCNC: 13 U/L (ref 7–45)
ANION GAP SERPL CALC-SCNC: 12 MMOL/L (ref 10–20)
AST SERPL W P-5'-P-CCNC: 16 U/L (ref 9–39)
B-HCG SERPL-ACNC: <2 MIU/ML
BASOPHILS # BLD AUTO: 0.03 X10*3/UL (ref 0–0.1)
BASOPHILS NFR BLD AUTO: 0.6 %
BILIRUB SERPL-MCNC: 0.3 MG/DL (ref 0–1.2)
BUN SERPL-MCNC: 8 MG/DL (ref 6–23)
CALCIUM SERPL-MCNC: 8.3 MG/DL (ref 8.6–10.3)
CARDIAC TROPONIN I PNL SERPL HS: <3 NG/L (ref 0–13)
CARDIAC TROPONIN I PNL SERPL HS: <3 NG/L (ref 0–13)
CHLORIDE SERPL-SCNC: 105 MMOL/L (ref 98–107)
CO2 SERPL-SCNC: 22 MMOL/L (ref 21–32)
CREAT SERPL-MCNC: 0.68 MG/DL (ref 0.5–1.05)
D DIMER PPP FEU-MCNC: 369 NG/ML FEU
EGFRCR SERPLBLD CKD-EPI 2021: >90 ML/MIN/1.73M*2
EOSINOPHIL # BLD AUTO: 0.16 X10*3/UL (ref 0–0.7)
EOSINOPHIL NFR BLD AUTO: 3 %
ERYTHROCYTE [DISTWIDTH] IN BLOOD BY AUTOMATED COUNT: 12.2 % (ref 11.5–14.5)
GLUCOSE SERPL-MCNC: 98 MG/DL (ref 74–99)
HCT VFR BLD AUTO: 38 % (ref 36–46)
HGB BLD-MCNC: 12.5 G/DL (ref 12–16)
IMM GRANULOCYTES # BLD AUTO: 0.01 X10*3/UL (ref 0–0.7)
IMM GRANULOCYTES NFR BLD AUTO: 0.2 % (ref 0–0.9)
LYMPHOCYTES # BLD AUTO: 1.44 X10*3/UL (ref 1.2–4.8)
LYMPHOCYTES NFR BLD AUTO: 27 %
MAGNESIUM SERPL-MCNC: 2.04 MG/DL (ref 1.6–2.4)
MCH RBC QN AUTO: 27.1 PG (ref 26–34)
MCHC RBC AUTO-ENTMCNC: 32.9 G/DL (ref 32–36)
MCV RBC AUTO: 82 FL (ref 80–100)
MONOCYTES # BLD AUTO: 0.49 X10*3/UL (ref 0.1–1)
MONOCYTES NFR BLD AUTO: 9.2 %
NEUTROPHILS # BLD AUTO: 3.2 X10*3/UL (ref 1.2–7.7)
NEUTROPHILS NFR BLD AUTO: 60 %
NRBC BLD-RTO: 0 /100 WBCS (ref 0–0)
PLATELET # BLD AUTO: 252 X10*3/UL (ref 150–450)
POTASSIUM SERPL-SCNC: 3.6 MMOL/L (ref 3.5–5.3)
PROT SERPL-MCNC: 6.5 G/DL (ref 6.4–8.2)
RBC # BLD AUTO: 4.61 X10*6/UL (ref 4–5.2)
SODIUM SERPL-SCNC: 135 MMOL/L (ref 136–145)
WBC # BLD AUTO: 5.3 X10*3/UL (ref 4.4–11.3)

## 2024-04-02 PROCEDURE — 71045 X-RAY EXAM CHEST 1 VIEW: CPT

## 2024-04-02 PROCEDURE — 85025 COMPLETE CBC W/AUTO DIFF WBC: CPT | Performed by: STUDENT IN AN ORGANIZED HEALTH CARE EDUCATION/TRAINING PROGRAM

## 2024-04-02 PROCEDURE — 71045 X-RAY EXAM CHEST 1 VIEW: CPT | Performed by: RADIOLOGY

## 2024-04-02 PROCEDURE — 84484 ASSAY OF TROPONIN QUANT: CPT | Performed by: STUDENT IN AN ORGANIZED HEALTH CARE EDUCATION/TRAINING PROGRAM

## 2024-04-02 PROCEDURE — 99285 EMERGENCY DEPT VISIT HI MDM: CPT | Performed by: STUDENT IN AN ORGANIZED HEALTH CARE EDUCATION/TRAINING PROGRAM

## 2024-04-02 PROCEDURE — 83735 ASSAY OF MAGNESIUM: CPT | Performed by: STUDENT IN AN ORGANIZED HEALTH CARE EDUCATION/TRAINING PROGRAM

## 2024-04-02 PROCEDURE — 84484 ASSAY OF TROPONIN QUANT: CPT | Mod: 91 | Performed by: STUDENT IN AN ORGANIZED HEALTH CARE EDUCATION/TRAINING PROGRAM

## 2024-04-02 PROCEDURE — 85379 FIBRIN DEGRADATION QUANT: CPT | Performed by: STUDENT IN AN ORGANIZED HEALTH CARE EDUCATION/TRAINING PROGRAM

## 2024-04-02 PROCEDURE — 80053 COMPREHEN METABOLIC PANEL: CPT | Performed by: STUDENT IN AN ORGANIZED HEALTH CARE EDUCATION/TRAINING PROGRAM

## 2024-04-02 PROCEDURE — 93005 ELECTROCARDIOGRAM TRACING: CPT | Mod: 59

## 2024-04-02 PROCEDURE — 36415 COLL VENOUS BLD VENIPUNCTURE: CPT | Performed by: STUDENT IN AN ORGANIZED HEALTH CARE EDUCATION/TRAINING PROGRAM

## 2024-04-02 PROCEDURE — 99284 EMERGENCY DEPT VISIT MOD MDM: CPT | Mod: 25

## 2024-04-02 PROCEDURE — 84702 CHORIONIC GONADOTROPIN TEST: CPT | Performed by: STUDENT IN AN ORGANIZED HEALTH CARE EDUCATION/TRAINING PROGRAM

## 2024-04-02 ASSESSMENT — PAIN SCALES - GENERAL
PAINLEVEL_OUTOF10: 5 - MODERATE PAIN
PAINLEVEL_OUTOF10: 0 - NO PAIN
PAINLEVEL_OUTOF10: 5 - MODERATE PAIN
PAINLEVEL_OUTOF10: 8

## 2024-04-02 ASSESSMENT — LIFESTYLE VARIABLES
EVER HAD A DRINK FIRST THING IN THE MORNING TO STEADY YOUR NERVES TO GET RID OF A HANGOVER: NO
TOTAL SCORE: 0
HAVE PEOPLE ANNOYED YOU BY CRITICIZING YOUR DRINKING: NO
EVER FELT BAD OR GUILTY ABOUT YOUR DRINKING: NO
HAVE YOU EVER FELT YOU SHOULD CUT DOWN ON YOUR DRINKING: NO

## 2024-04-02 ASSESSMENT — PAIN - FUNCTIONAL ASSESSMENT
PAIN_FUNCTIONAL_ASSESSMENT: 0-10
PAIN_FUNCTIONAL_ASSESSMENT: 0-10

## 2024-04-02 ASSESSMENT — PAIN DESCRIPTION - DESCRIPTORS
DESCRIPTORS: SHARP
DESCRIPTORS: SHARP

## 2024-04-02 ASSESSMENT — COLUMBIA-SUICIDE SEVERITY RATING SCALE - C-SSRS
6. HAVE YOU EVER DONE ANYTHING, STARTED TO DO ANYTHING, OR PREPARED TO DO ANYTHING TO END YOUR LIFE?: NO
2. HAVE YOU ACTUALLY HAD ANY THOUGHTS OF KILLING YOURSELF?: NO
1. IN THE PAST MONTH, HAVE YOU WISHED YOU WERE DEAD OR WISHED YOU COULD GO TO SLEEP AND NOT WAKE UP?: NO

## 2024-04-02 ASSESSMENT — PAIN DESCRIPTION - LOCATION: LOCATION: CHEST

## 2024-04-02 ASSESSMENT — PAIN DESCRIPTION - PAIN TYPE: TYPE: ACUTE PAIN

## 2024-04-02 ASSESSMENT — PAIN DESCRIPTION - ONSET: ONSET: AWAKENED FROM SLEEP

## 2024-04-02 ASSESSMENT — PAIN DESCRIPTION - FREQUENCY: FREQUENCY: INTERMITTENT

## 2024-04-02 ASSESSMENT — PAIN DESCRIPTION - ORIENTATION: ORIENTATION: LEFT;MID

## 2024-04-02 NOTE — Clinical Note
Mary Jackson was seen and treated in our emergency department on 4/2/2024.  She may return to work on 04/03/2024.       If you have any questions or concerns, please don't hesitate to call.      Rahul Can, DO

## 2024-04-02 NOTE — ED PROVIDER NOTES
EMERGENCY DEPARTMENT ENCOUNTER      Pt Name: Mary Jackson  MRN: [id number]  Birthdate 1995  Date of evaluation: 4/2/2024  Provider: Mendel Dalton MD    CHIEF COMPLAINT       Chief Complaint   Patient presents with    Chest Pain         HISTORY OF PRESENT ILLNESS    Patient is a 28-year-old female with history of Graves' disease and history of SVT status post ablations who presents with complaint of chest pain.  Patient states that she has chest pain that started around 0630 hrs. this morning in the center of her chest with slight radiation to the left side and slightly into the left arm.  Denies any fevers, chills, night sweats, shortness of breath, abdominal pain, nausea/vomiting.  Came into the ER for further evaluation.  States that she is not having any palpitations.    Past medical history: Graves' disease, SVT status post ablations  Past surgical history: Ablations x 2 this past year  Allergies: Please see documented list  Social history: Non-smoker.  Denies alcohol or illicit drug use.  Family history: No heart attack in mother or father.  Grandfather had history of heart attack.          Nursing Notes were reviewed.    PAST MEDICAL HISTORY     Past Medical History:   Diagnosis Date    12 weeks gestation of pregnancy 12/20/2019    12 weeks gestation of pregnancy    Acute upper respiratory infection, unspecified 10/16/2019    Viral upper respiratory tract infection    Chronic sinusitis, unspecified 03/16/2020    Other sinusitis    Encounter for immunization 11/25/2020    Encounter for vaccination    Encounter for immunization 01/06/2021    Encounter for vaccination    Encounter for supervision of normal pregnancy, unspecified, unspecified trimester 07/10/2020    Prenatal care    Endocrine, nutritional and metabolic diseases complicating pregnancy, unspecified trimester 06/18/2020    Thyroid disease in pregnancy    Other chest pain 10/02/2018    Atypical chest pain    Other conditions influencing health  status 04/19/2019    History of pregnancy    Personal history of other diseases of the respiratory system 03/09/2020    History of sore throat    Personal history of other diseases of the respiratory system 10/16/2019    History of sore throat    Personal history of other infectious and parasitic diseases 03/09/2020    History of viral infection    Personal history of other specified conditions 04/01/2022    History of palpitations    Personal history of other specified conditions 03/19/2019    History of palpitations    Personal history of other specified conditions 05/03/2021    History of chest pain    Sacrococcygeal disorders, not elsewhere classified 05/10/2019    Tail bone pain         SURGICAL HISTORY       Past Surgical History:   Procedure Laterality Date    HERNIA REPAIR  07/27/2018    Hernia Repair    OTHER SURGICAL HISTORY  09/24/2021    Catheter ablation    OTHER SURGICAL HISTORY  05/03/2021    Eye surgery    OTHER SURGICAL HISTORY  05/03/2021    Hernia repair    OTHER SURGICAL HISTORY  05/03/2021    Tonsillectomy    TONSILLECTOMY  07/27/2018    Tonsillectomy         CURRENT MEDICATIONS       Discharge Medication List as of 4/2/2024 10:34 AM        CONTINUE these medications which have NOT CHANGED    Details   escitalopram (Lexapro) 10 mg tablet Take 1 tablet (10 mg) by mouth once daily., Starting Tue 3/26/2024, Until Fri 3/21/2025, Normal      magnesium oxide (Mag-Ox) 400 mg (241.3 mg magnesium) tablet Take 1 tablet (400 mg) by mouth 2 times a day., Starting Mon 3/18/2024, Normal      metoprolol succinate XL (Toprol-XL) 25 mg 24 hr tablet Take 1 tablet (25 mg) by mouth once daily., Starting Mon 3/18/2024, Normal      metoprolol tartrate (Lopressor) 25 mg tablet Take 1 tablet (25 mg) by mouth 2 times a day., Starting Tue 12/7/2021, Historical Med      norethindrone-e.estradioL-iron (Microgestin FE 1.5/30) 1.5 mg-30 mcg (21)/75 mg (7) tablet Take 1 tablet by mouth once daily., Starting Tue 1/23/2024,  Normal             ALLERGIES     Clarithromycin, Doxycycline, Tetracycline, Tetracyclines, Oxaprozin, and Sulfamethoxazole-trimethoprim    FAMILY HISTORY       Family History   Problem Relation Name Age of Onset    Anxiety disorder Mother      Thyroid disease Other Paternal GGM     Thyroid disease Other Cousin           SOCIAL HISTORY       Social History     Socioeconomic History    Marital status: Single     Spouse name: None    Number of children: None    Years of education: None    Highest education level: None   Occupational History    None   Tobacco Use    Smoking status: Former     Types: Cigarettes    Smokeless tobacco: Never   Substance and Sexual Activity    Alcohol use: Yes    Drug use: None    Sexual activity: None   Other Topics Concern    None   Social History Narrative    None     Social Determinants of Health     Financial Resource Strain: Not on file   Food Insecurity: Not on file   Transportation Needs: Not on file   Physical Activity: Not on file   Stress: Not on file   Social Connections: Not on file   Intimate Partner Violence: Not on file   Housing Stability: Not on file       SCREENINGS                        PHYSICAL EXAM    (up to 7 for level 4, 8 or more for level 5)     ED Triage Vitals [04/02/24 0750]   Temperature Heart Rate Respirations BP   36.4 °C (97.5 °F) 88 18 (!) 139/97      Pulse Ox Temp Source Heart Rate Source Patient Position   98 % Temporal Monitor Sitting      BP Location FiO2 (%)     Right arm --       Physical Exam  Vitals and nursing note reviewed.   Constitutional:       General: She is not in acute distress.     Appearance: Normal appearance. She is well-developed. She is not ill-appearing or toxic-appearing.   HENT:      Head: Normocephalic and atraumatic.      Nose: Nose normal.      Mouth/Throat:      Mouth: Mucous membranes are moist.      Pharynx: Oropharynx is clear.   Eyes:      Extraocular Movements: Extraocular movements intact.      Conjunctiva/sclera:  Conjunctivae normal.   Cardiovascular:      Rate and Rhythm: Normal rate and regular rhythm.      Pulses: Normal pulses.      Heart sounds: Normal heart sounds.   Pulmonary:      Effort: Pulmonary effort is normal. No respiratory distress.      Breath sounds: Normal breath sounds.   Abdominal:      General: Bowel sounds are normal. There is no distension.      Palpations: Abdomen is soft.      Tenderness: There is no abdominal tenderness.   Musculoskeletal:         General: Normal range of motion.   Skin:     General: Skin is warm and dry.      Capillary Refill: Capillary refill takes less than 2 seconds.   Neurological:      General: No focal deficit present.      Mental Status: She is alert. Mental status is at baseline.   Psychiatric:         Mood and Affect: Mood normal.         Behavior: Behavior normal.         Thought Content: Thought content normal.         Judgment: Judgment normal.          DIAGNOSTIC RESULTS     LABS:  Labs Reviewed   COMPREHENSIVE METABOLIC PANEL - Abnormal       Result Value    Glucose 98      Sodium 135 (*)     Potassium 3.6      Chloride 105      Bicarbonate 22      Anion Gap 12      Urea Nitrogen 8      Creatinine 0.68      eGFR >90      Calcium 8.3 (*)     Albumin 3.9      Alkaline Phosphatase 58      Total Protein 6.5      AST 16      Bilirubin, Total 0.3      ALT 13     MAGNESIUM - Normal    Magnesium 2.04     HUMAN CHORIONIC GONADOTROPIN, SERUM QUANTITATIVE - Normal    HCG, Beta-Quantitative <2      Narrative:      Total HCG measurement is performed using the [employer] Access   Immunoassay which detects intact HCG and free beta HCG subunit.    This test is not indicated for use as a tumor marker.   HCG testing is performed using a different test methodology at [facility] than other [facility]. Direct result comparison   should only be made within the same method.       SERIAL TROPONIN-INITIAL - Normal    Troponin I, High Sensitivity <3      Narrative:      Less than 99th percentile of normal range cutoff-  Female and children under 18 years old <14 ng/L; Male <21 ng/L: Negative  Repeat testing should be performed if clinically indicated.     Female and children under 18 years old 14-50 ng/L; Male 21-50 ng/L:  Consistent with possible cardiac damage and possible increased clinical   risk. Serial measurements may help to assess extent of myocardial damage.     >50 ng/L: Consistent with cardiac damage, increased clinical risk and  myocardial infarction. Serial measurements may help assess extent of   myocardial damage.      NOTE: Children less than 1 year old may have higher baseline troponin   levels and results should be interpreted in conjunction with the overall   clinical context.     NOTE: Troponin I testing is performed using a different   testing methodology at [facility] than at other   [facility]. Direct result comparisons should only   be made within the same method.   D-DIMER, VTE EXCLUSION - Normal    D-Dimer, Quantitative VTE Exclusion 369      Narrative:     The VTE Exclusion D-Dimer assay is reported in ng/mL Fibrinogen Equivalent Units (FEU).    Per 's instructions for use, a value of less than 500 ng/mL (FEU) may help to exclude DVT or PE in outpatients when the assay is used with a clinical pretest probability assessment.(AE must utilize and document eCalc 'Wells Score Deep Vein Thrombosis Risk' for DVT exclusion only. Emergency Department should utilize  Guidelines for Emergency Department Use of the VTE Exclusion D-Dimer and Clinical Pretest probability assessment model for DVT or PE exclusion.)   SERIAL TROPONIN, 1 HOUR - Normal    Troponin I, High Sensitivity <3      Narrative:     Less than 99th percentile of normal range cutoff-  Female and children under 18 years old <14 ng/L; Male <21 ng/L: Negative  Repeat testing should be performed if clinically indicated.     Female and children under 18 years old  14-50 ng/L; Male 21-50 ng/L:  Consistent with possible cardiac damage and possible increased clinical   risk. Serial measurements may help to assess extent of myocardial damage.     >50 ng/L: Consistent with cardiac damage, increased clinical risk and  myocardial infarction. Serial measurements may help assess extent of   myocardial damage.      NOTE: Children less than 1 year old may have higher baseline troponin   levels and results should be interpreted in conjunction with the overall   clinical context.     NOTE: Troponin I testing is performed using a different   testing methodology at [facility] than at other   [facility]. Direct result comparisons should only   be made within the same method.   TROPONIN SERIES- (INITIAL, 1 HR)    Narrative:     The following orders were created for panel order Troponin I Series, High Sensitivity (0, 1 HR).  Procedure                               Abnormality         Status                     ---------                               -----------         ------                     Troponin I, High Sensiti...[[id number]]  Normal              Final result               Troponin, High Sensitivi...[[id number]]  Normal              Final result                 Please view results for these tests on the individual orders.   CBC WITH AUTO DIFFERENTIAL    WBC 5.3      nRBC 0.0      RBC 4.61      Hemoglobin 12.5      Hematocrit 38.0      MCV 82      MCH 27.1      MCHC 32.9      RDW 12.2      Platelets 252      Neutrophils % 60.0      Immature Granulocytes %, Automated 0.2      Lymphocytes % 27.0      Monocytes % 9.2      Eosinophils % 3.0      Basophils % 0.6      Neutrophils Absolute 3.20      Immature Granulocytes Absolute, Automated 0.01      Lymphocytes Absolute 1.44      Monocytes Absolute 0.49      Eosinophils Absolute 0.16      Basophils Absolute 0.03         All other labs were within normal range or not returned as of this dictation.    Imaging  XR chest 1 view    Final Result   No radiographic evidence of an acute cardiopulmonary process.        MACRO:   None.        Signed by: Reg Rodriges 4/2/2024 8:54 AM   Dictation workstation:   [id number]           Procedures  Procedures     EMERGENCY DEPARTMENT COURSE/MDM:     ED Course as of 04/02/24 1613   Tue Apr 02, 2024   0835 EKG independently interpreted by attending physician    0747 hrs.: Sinus rhythm with short UT interval with ventricular rate 96 bpm.  QTc 467.  QRS 90.  No acute injury pattern seen. [AI]   0926 EKG independently interpreted by attending physician    0906 hrs.: Sinus rhythm with sinus arrhythmia with short UT interval with ventricular rate 74 bpm.  QTc 475.  QRS 98.  Possible left atrial enlargement.  No acute injury pattern seen. [AI]      ED Course User Index  [AI] Rahul aCn,          Diagnoses as of 04/02/24 1613   Chest pain, unspecified type        Medical Decision Making  28-year-old female presents with complaint of chest pain.    Nontoxic-appearing female, no acute distress.  Is on birth control.  Will obtain a cardiac workup and a D-dimer.      Given patient's extensive cardiac history including multiple ablations for SVT, cardiac workup was initiated including a dimer.  EKG without acute injury pattern and troponin series within normal limits.  D-dimer normal, CT PE deferred.  Chest x-ray unremarkable.  CMP with mild hyponatremia 135 only.  Hematology unremarkable.  Case discussed at length with the patient.  Pain thought not to be cardiac in origin.  Patient discharged home in satisfactory condition with instructions to follow-up with her cardiologist and primary care physician.  All questions answered and return precautions discussed.    EKG demonstrated sinus rhythm at a rate of 96, QTc of 467.  No acute injury pattern seen.    Patient and or family in agreement and understanding of treatment plan.  All questions answered.      I reviewed the case with the attending ED physician. The  attending ED physician agrees with the plan. Patient and/or patient´s representative was counseled regarding labs, imaging, likely diagnosis, and plan. All questions were answered.    ED Medications administered this visit:  Medications - No data to display    New Prescriptions from this visit:    Discharge Medication List as of 4/2/2024 10:34 AM          Follow-up:  Gisela Bowman MD  [address] [facility]  [facility] [address]  [phone]    Schedule an appointment as soon as possible for a visit       Mireille Montiel MD  [id number] [place] [address] [address]  [facility] [address]  [phone]              Final Impression:   1. Chest pain, unspecified type          (Please note that portions of this note were completed with a voice recognition program.  Efforts were made to edit the dictations but occasionally words are mis-transcribed.)     Mendel Dalton MD  Resident  04/02/24 1611    The patient was seen by the resident/fellow.  I have personally performed a substantive portion of the encounter.  I have seen and examined the patient; agree with the workup, evaluation, MDM, management and diagnosis.  The care plan has been discussed with the resident; I have reviewed the resident’s note and agree with the documented findings.           Rahul Can DO  04/03/24 0030

## 2024-04-02 NOTE — DISCHARGE INSTRUCTIONS
Please follow up with your primary care physician within 1-2 days.  Please follow-up with your cardiologist.  Call for an appointment.  If you have worsening chest pain, difficulty breathing, or other concerning symptoms, please seek immediate medical attention and come back to the ER.    If you have a return or worsening of symptoms, please seek immediate medical attention.

## 2024-05-01 ENCOUNTER — OFFICE VISIT (OUTPATIENT)
Dept: OBSTETRICS AND GYNECOLOGY | Facility: CLINIC | Age: 29
End: 2024-05-01
Payer: COMMERCIAL

## 2024-05-01 DIAGNOSIS — B37.31 YEAST VAGINITIS: Primary | ICD-10-CM

## 2024-05-01 DIAGNOSIS — N76.6 GENITAL LABIAL ULCER: ICD-10-CM

## 2024-05-01 DIAGNOSIS — N89.8 VAGINAL ITCHING: ICD-10-CM

## 2024-05-01 LAB
CLUE CELLS VAG LPF-#/AREA: ABNORMAL /[LPF]
NUGENT SCORE: 1
YEAST VAG WET PREP-#/AREA: PRESENT

## 2024-05-01 PROCEDURE — 87529 HSV DNA AMP PROBE: CPT

## 2024-05-01 PROCEDURE — 3008F BODY MASS INDEX DOCD: CPT

## 2024-05-01 PROCEDURE — 1036F TOBACCO NON-USER: CPT

## 2024-05-01 PROCEDURE — 99213 OFFICE O/P EST LOW 20 MIN: CPT

## 2024-05-01 PROCEDURE — 87205 SMEAR GRAM STAIN: CPT

## 2024-05-01 RX ORDER — CLOBETASOL PROPIONATE 0.5 MG/G
OINTMENT TOPICAL 2 TIMES DAILY
Qty: 15 G | Refills: 0 | Status: SHIPPED | OUTPATIENT
Start: 2024-05-01

## 2024-05-01 ASSESSMENT — ENCOUNTER SYMPTOMS
HEADACHES: 0
SORE THROAT: 0
CONSTIPATION: 0
DYSURIA: 0
ABDOMINAL PAIN: 0
NAUSEA: 0
FLANK PAIN: 0
ANOREXIA: 0
DIARRHEA: 0
FEVER: 0
BACK PAIN: 1
FREQUENCY: 0
CHILLS: 0
HEMATURIA: 0
VOMITING: 0

## 2024-05-01 NOTE — PROGRESS NOTES
Subjective   Patient ID: Mary Jackson is a 28 y.o. female who presents for No chief complaint on file..    Patient here today for concerns of vaginal itching that started yesterday. Patient denies any vaginal irritation, burning, or discharge. Patient denies any change in sex partner or concern for STI's. Patient does endorse recently starting to use condoms over the last month.    Review of Systems   Constitutional:  Negative for chills and fever.   HENT:  Negative for sore throat.    Gastrointestinal:  Negative for abdominal pain, constipation, diarrhea, nausea and vomiting.   Genitourinary:  Positive for vaginal discharge. Negative for dysuria, flank pain, frequency, hematuria, pelvic pain and urgency.   Musculoskeletal:  Positive for back pain.   Skin:  Negative for rash.   Neurological:  Negative for headaches.       Objective   Physical Exam  Constitutional:       Appearance: Normal appearance.   Eyes:      Conjunctiva/sclera: Conjunctivae normal.   Pulmonary:      Effort: Pulmonary effort is normal.   Genitourinary:     Labia:         Right: Lesion present.         Left: Lesion present.           Comments: Erythema noted at introitus. Ulcerations noted on labia major where indicated  Neurological:      Mental Status: She is alert.   Psychiatric:         Mood and Affect: Mood normal.         Assessment/Plan   Vaginitis culture obtained today to screen for vaginitis; will await result prior to treatment.  Advised use of Rephresh/Ultra Lisa Women's  vaginal health probiotics. Perineal/vulvar hygiene discussed. Patient encouraged to consider changes in her diet to include of probiotics, unsweetened yogurt, fermented foods and drinks, and decrease in sugar.      Papular rash with labial ulcers potentially due to scratching area. Low suspicion for HSV as lesions are not painful, though obtained culture to confirm. Likely contact dermatitis: patient advised to stop using current condoms and switch to a  hypoallergenic type. Rx for clobetasol sent to pharmacy.       OTTO Kumar 05/01/24 9:14 AM

## 2024-05-02 LAB
ATRIAL RATE: 74 BPM
ATRIAL RATE: 96 BPM
HSV1 DNA SKIN QL NAA+PROBE: NOT DETECTED
HSV2 DNA SKIN QL NAA+PROBE: NOT DETECTED
P AXIS: 30 DEGREES
P AXIS: 45 DEGREES
P OFFSET: 202 MS
P OFFSET: 208 MS
P ONSET: 155 MS
P ONSET: 163 MS
PR INTERVAL: 102 MS
PR INTERVAL: 108 MS
Q ONSET: 209 MS
Q ONSET: 214 MS
QRS COUNT: 12 BEATS
QRS COUNT: 16 BEATS
QRS DURATION: 90 MS
QRS DURATION: 98 MS
QT INTERVAL: 370 MS
QT INTERVAL: 428 MS
QTC CALCULATION(BAZETT): 467 MS
QTC CALCULATION(BAZETT): 475 MS
QTC FREDERICIA: 432 MS
QTC FREDERICIA: 459 MS
R AXIS: 50 DEGREES
R AXIS: 52 DEGREES
T AXIS: 14 DEGREES
T AXIS: 18 DEGREES
T OFFSET: 399 MS
T OFFSET: 423 MS
VENTRICULAR RATE: 74 BPM
VENTRICULAR RATE: 96 BPM

## 2024-05-02 RX ORDER — FLUCONAZOLE 150 MG/1
TABLET ORAL
Qty: 2 TABLET | Refills: 0 | Status: SHIPPED | OUTPATIENT
Start: 2024-05-02

## 2024-05-20 RX ORDER — SERTRALINE HYDROCHLORIDE 50 MG/1
75 TABLET, FILM COATED ORAL DAILY
COMMUNITY
Start: 2024-04-08

## 2024-06-10 DIAGNOSIS — H10.9 CONJUNCTIVITIS OF RIGHT EYE, UNSPECIFIED CONJUNCTIVITIS TYPE: Primary | ICD-10-CM

## 2024-06-10 RX ORDER — TOBRAMYCIN 3 MG/ML
1 SOLUTION/ DROPS OPHTHALMIC EVERY 4 HOURS
Qty: 5 ML | Refills: 0 | Status: SHIPPED | OUTPATIENT
Start: 2024-06-10 | End: 2024-06-17

## 2024-06-13 ENCOUNTER — APPOINTMENT (OUTPATIENT)
Dept: RADIOLOGY | Facility: HOSPITAL | Age: 29
End: 2024-06-13
Payer: COMMERCIAL

## 2024-06-13 ENCOUNTER — HOSPITAL ENCOUNTER (EMERGENCY)
Facility: HOSPITAL | Age: 29
Discharge: HOME | End: 2024-06-14
Payer: COMMERCIAL

## 2024-06-13 VITALS
HEIGHT: 66 IN | WEIGHT: 185 LBS | HEART RATE: 99 BPM | TEMPERATURE: 97.5 F | DIASTOLIC BLOOD PRESSURE: 105 MMHG | SYSTOLIC BLOOD PRESSURE: 164 MMHG | BODY MASS INDEX: 29.73 KG/M2 | RESPIRATION RATE: 16 BRPM | OXYGEN SATURATION: 98 %

## 2024-06-13 DIAGNOSIS — S93.506A SPRAIN OF FIFTH TOE: ICD-10-CM

## 2024-06-13 DIAGNOSIS — S90.122A CONTUSION OF FIFTH TOE OF LEFT FOOT, INITIAL ENCOUNTER: Primary | ICD-10-CM

## 2024-06-13 PROCEDURE — 99283 EMERGENCY DEPT VISIT LOW MDM: CPT

## 2024-06-13 PROCEDURE — 73630 X-RAY EXAM OF FOOT: CPT | Mod: LT

## 2024-06-13 ASSESSMENT — PAIN DESCRIPTION - LOCATION: LOCATION: TOE (COMMENT WHICH ONE)

## 2024-06-13 ASSESSMENT — PAIN DESCRIPTION - PAIN TYPE: TYPE: ACUTE PAIN

## 2024-06-13 ASSESSMENT — COLUMBIA-SUICIDE SEVERITY RATING SCALE - C-SSRS
1. IN THE PAST MONTH, HAVE YOU WISHED YOU WERE DEAD OR WISHED YOU COULD GO TO SLEEP AND NOT WAKE UP?: NO
6. HAVE YOU EVER DONE ANYTHING, STARTED TO DO ANYTHING, OR PREPARED TO DO ANYTHING TO END YOUR LIFE?: NO
2. HAVE YOU ACTUALLY HAD ANY THOUGHTS OF KILLING YOURSELF?: NO

## 2024-06-13 ASSESSMENT — PAIN - FUNCTIONAL ASSESSMENT: PAIN_FUNCTIONAL_ASSESSMENT: 0-10

## 2024-06-13 ASSESSMENT — PAIN SCALES - GENERAL: PAINLEVEL_OUTOF10: 9

## 2024-06-13 ASSESSMENT — PAIN DESCRIPTION - ORIENTATION: ORIENTATION: LEFT

## 2024-06-14 PROCEDURE — 73630 X-RAY EXAM OF FOOT: CPT | Mod: LEFT SIDE | Performed by: RADIOLOGY

## 2024-06-14 ASSESSMENT — PAIN SCALES - GENERAL: PAINLEVEL_OUTOF10: 4

## 2024-06-14 ASSESSMENT — PAIN - FUNCTIONAL ASSESSMENT: PAIN_FUNCTIONAL_ASSESSMENT: 0-10

## 2024-06-14 NOTE — PROGRESS NOTES
Emergency Medicine Transition of Care Note.    I received Mary Jackson in signout from Aaron Farnsworth PA-C.  Please see the previous ED provider note for all HPI, PE and MDM up to the time of signout at 0100. This is in addition to the primary record.    In brief Mary Jackson is an 28 y.o. female presenting for   Chief Complaint   Patient presents with   • Foot Injury     I just stubbed my toe twice     At the time of signout we were awaiting: xray    Diagnoses as of 06/14/24 0207   Contusion of fifth toe of left foot, initial encounter   Sprain of fifth toe       Medical Decision Making    Patient wanted to wait for formal radiologist read.  I called radiology operations to expedite this and still no results.  On my independent review there is no obvious fracture or dislocation and management would not change significantly if fracture identified.  Patient given postop shoe and crutches and advised RICE and OTC analgesics as needed.  She is requesting to be discharged because she has to work in the morning.    Final diagnoses:   [S90.122A] Contusion of fifth toe of left foot, initial encounter           Procedure  Procedures    Jorge Shultz PA-C

## 2024-06-14 NOTE — ED PROVIDER NOTES
HPI   Chief Complaint   Patient presents with    Foot Injury     I just stubbed my toe twice       A 28-year-old female patient comes in the emergency department today with complaints of left foot fifth toe pain.  She describes that she stepped it twice this evening with an hour's period of time.  Once on one of her children's toys a second time on the kitchen chair.  She rates pain 9 out of 10 on the pain scale described as sharp and throbbing.  For this purpose comes in the emergency department today further evaluation.  Otherwise no other complaints at present time.                          Caleb Coma Scale Score: 15                     Patient History   Past Medical History:   Diagnosis Date    12 weeks gestation of pregnancy ([facility]) 12/20/2019    12 weeks gestation of pregnancy    Acute upper respiratory infection, unspecified 10/16/2019    Viral upper respiratory tract infection    Chronic sinusitis, unspecified 03/16/2020    Other sinusitis    Encounter for immunization 11/25/2020    Encounter for vaccination    Encounter for immunization 01/06/2021    Encounter for vaccination    Encounter for supervision of normal pregnancy, unspecified, unspecified trimester ([facility]) 07/10/2020    Prenatal care    Endocrine, nutritional and metabolic diseases complicating pregnancy, unspecified trimester ([facility]) 06/18/2020    Thyroid disease in pregnancy    Other chest pain 10/02/2018    Atypical chest pain    Other conditions influencing health status 04/19/2019    History of pregnancy    Personal history of other diseases of the respiratory system 03/09/2020    History of sore throat    Personal history of other diseases of the respiratory system 10/16/2019    History of sore throat    Personal history of other infectious and parasitic diseases 03/09/2020    History of viral infection    Personal history of other specified conditions 04/01/2022    History of palpitations    Personal history of other specified  conditions 03/19/2019    History of palpitations    Personal history of other specified conditions 05/03/2021    History of chest pain    Sacrococcygeal disorders, not elsewhere classified 05/10/2019    Tail bone pain     Past Surgical History:   Procedure Laterality Date    HERNIA REPAIR  07/27/2018    Hernia Repair    OTHER SURGICAL HISTORY  09/24/2021    Catheter ablation    OTHER SURGICAL HISTORY  05/03/2021    Eye surgery    OTHER SURGICAL HISTORY  05/03/2021    Hernia repair    OTHER SURGICAL HISTORY  05/03/2021    Tonsillectomy    TONSILLECTOMY  07/27/2018    Tonsillectomy     Family History   Problem Relation Name Age of Onset    Anxiety disorder Mother      Thyroid disease Other Paternal GGM     Thyroid disease Other Cousin      Social History     Tobacco Use    Smoking status: Former     Types: Cigarettes    Smokeless tobacco: Never   Substance Use Topics    Alcohol use: Yes    Drug use: Not on file       Physical Exam   ED Triage Vitals [06/13/24 2339]   Temperature Heart Rate Respirations BP   36.4 °C (97.5 °F) 99 16 (!) 164/105      Pulse Ox Temp Source Heart Rate Source Patient Position   98 % Temporal Monitor Sitting      BP Location FiO2 (%)     Right arm --       Physical Exam  Constitutional:       Appearance: Normal appearance.   HENT:      Head: Normocephalic and atraumatic.      Nose: Nose normal.   Eyes:      Extraocular Movements: Extraocular movements intact.      Conjunctiva/sclera: Conjunctivae normal.      Pupils: Pupils are equal, round, and reactive to light.   Cardiovascular:      Rate and Rhythm: Normal rate and regular rhythm.   Pulmonary:      Effort: Pulmonary effort is normal. No respiratory distress.      Breath sounds: Normal breath sounds. No stridor. No wheezing.   Musculoskeletal:         General: Tenderness (Tenderness palpation over the left fifth toe) present. Normal range of motion.      Cervical back: Normal range of motion.   Skin:     General: Skin is warm and dry.       Findings: Bruising (Left fifth toe) present.   Neurological:      General: No focal deficit present.      Mental Status: She is alert and oriented to person, place, and time. Mental status is at baseline.   Psychiatric:         Mood and Affect: Mood normal.         ED Course & MDM   Diagnoses as of 06/14/24 0053   Contusion of fifth toe of left foot, initial encounter       Medical Decision Making  A 28-year-old female patient comes in the emergency department today with complaints of left foot fifth toe pain.  She describes that she stepped it twice this evening with an hour's period of time.  Once on one of her children's toys a second time on the kitchen chair.  She rates pain 9 out of 10 on the pain scale described as sharp and throbbing.  For this purpose comes in the emergency department today further evaluation.  Otherwise no other complaints at present time.    X-ray left foot ordered to rule out any acute fracture, dislocation.  Offered patient medication for pain patient declines.    Handoff to Joreg Shultz PA-C pending radiology results and disposition        Labs Reviewed - No data to display     XR foot left 3+ views    (Results Pending)         Procedure  Procedures     Teddy Farnsworth PA-C  06/14/24 0053

## 2024-06-21 ENCOUNTER — APPOINTMENT (OUTPATIENT)
Dept: CARDIOLOGY | Facility: CLINIC | Age: 29
End: 2024-06-21
Payer: COMMERCIAL

## 2024-06-21 VITALS
HEIGHT: 66 IN | WEIGHT: 191 LBS | BODY MASS INDEX: 30.7 KG/M2 | SYSTOLIC BLOOD PRESSURE: 110 MMHG | HEART RATE: 72 BPM | DIASTOLIC BLOOD PRESSURE: 72 MMHG

## 2024-06-21 DIAGNOSIS — S99.921A RIGHT FOOT INJURY, INITIAL ENCOUNTER: ICD-10-CM

## 2024-06-21 DIAGNOSIS — Z71.89 ENCOUNTER TO DISCUSS TREATMENT OPTIONS: ICD-10-CM

## 2024-06-21 DIAGNOSIS — R00.2 PALPITATIONS: ICD-10-CM

## 2024-06-21 DIAGNOSIS — I47.10 PAROXYSMAL SVT (SUPRAVENTRICULAR TACHYCARDIA) (CMS-HCC): Primary | ICD-10-CM

## 2024-06-21 DIAGNOSIS — Z71.89 ENCOUNTER FOR MEDICATION REVIEW AND COUNSELING: ICD-10-CM

## 2024-06-21 DIAGNOSIS — I49.1 PAC (PREMATURE ATRIAL CONTRACTION): ICD-10-CM

## 2024-06-21 DIAGNOSIS — I47.11 INAPPROPRIATE SINUS TACHYCARDIA (CMS-HCC): ICD-10-CM

## 2024-06-21 PROBLEM — Z87.891 FORMER SMOKER: Status: ACTIVE | Noted: 2024-06-21

## 2024-06-21 PROCEDURE — 93000 ELECTROCARDIOGRAM COMPLETE: CPT | Performed by: INTERNAL MEDICINE

## 2024-06-21 PROCEDURE — 3008F BODY MASS INDEX DOCD: CPT | Performed by: INTERNAL MEDICINE

## 2024-06-21 PROCEDURE — 1036F TOBACCO NON-USER: CPT | Performed by: INTERNAL MEDICINE

## 2024-06-21 PROCEDURE — 99215 OFFICE O/P EST HI 40 MIN: CPT | Performed by: INTERNAL MEDICINE

## 2024-06-21 RX ORDER — DILTIAZEM HYDROCHLORIDE 120 MG/1
120 CAPSULE, COATED, EXTENDED RELEASE ORAL DAILY
Qty: 90 CAPSULE | Refills: 3 | Status: SHIPPED | OUTPATIENT
Start: 2024-06-21 | End: 2025-06-21

## 2024-06-21 RX ORDER — LANOLIN ALCOHOL/MO/W.PET/CERES
1 CREAM (GRAM) TOPICAL 2 TIMES DAILY
Qty: 180 TABLET | Refills: 3 | Status: SHIPPED | OUTPATIENT
Start: 2024-06-21

## 2024-06-21 ASSESSMENT — ENCOUNTER SYMPTOMS
IRREGULAR HEARTBEAT: 1
DYSPNEA ON EXERTION: 0
PALPITATIONS: 1

## 2024-06-21 NOTE — PROGRESS NOTES
"Chief Complaint:   Follow-up (1 year follow up)     History Of Present Illness:    Mary Jackson is a 28 y.o. female presenting with recurrent palpitation.  She felt palpitation at rest for two days in a row.    She denies any lightheadedness, dizziness, or syncope.     Last Recorded Vitals:  Vitals:    06/21/24 1540 06/21/24 1612   BP: (!) 124/92 110/72   BP Location: Right arm    Patient Position: Sitting    Pulse: 72    Weight: 86.6 kg (191 lb)    Height: 1.676 m (5' 6\")        Past Medical History:  See List     Past Surgical History:  See List      Social History:  She reports that she has quit smoking. Her smoking use included cigarettes. She has never used smokeless tobacco. She reports current alcohol use. She reports that she does not currently use drugs after having used the following drugs: Marijuana.    Family History:  Family History   Problem Relation Name Age of Onset    Anxiety disorder Mother      Thyroid disease Other Paternal GGM     Thyroid disease Other Cousin     Thyroid disease Paternal Grandmother Marianne     Asthma Brother Bj         Allergies:  Clarithromycin, Doxycycline, Tetracycline, Tetracyclines, Oxaprozin, and Sulfamethoxazole-trimethoprim    Outpatient Medications:  Current Outpatient Medications   Medication Instructions    clobetasol (Temovate) 0.05 % ointment Topical, 2 times daily    escitalopram (LEXAPRO) 10 mg, oral, Daily    fluconazole (Diflucan) 150 mg tablet Take one tablet today and repeat in 72 hours if symptoms persist.    magnesium oxide (MAG-OX) 400 mg, oral, 2 times daily    metoprolol succinate XL (TOPROL-XL) 25 mg, oral, Daily    norethindrone-e.estradioL-iron (Microgestin FE 1.5/30) 1.5 mg-30 mcg (21)/75 mg (7) tablet 1 tablet, oral, Daily    sertraline (ZOLOFT) 75 mg, oral, Daily   Review of Systems   Cardiovascular:  Positive for irregular heartbeat and palpitations. Negative for chest pain and dyspnea on exertion.   All other systems reviewed and are " negative.        Physical Exam:  Constitutional:       General: Awake.      Appearance: Normal and healthy appearance. Overweight and not in distress.   Neck:      Vascular: No JVR. JVD normal.   Pulmonary:      Effort: Pulmonary effort is normal.      Breath sounds: Normal breath sounds. No wheezing. No rhonchi. No rales.   Chest:      Chest wall: Not tender to palpatation.   Cardiovascular:      PMI at left midclavicular line. Normal rate. Regular rhythm. Normal S1. Normal S2.       Murmurs: There is no murmur.      No gallop.  No click. No rub.   Pulses:     Intact distal pulses.   Edema:     Peripheral edema absent.   Abdominal:      Tenderness: There is no abdominal tenderness.   Musculoskeletal: Normal range of motion.         General: No tenderness. Skin:     General: Skin is warm and dry.   Neurological:      General: No focal deficit present.      Mental Status: Alert and oriented to person, place and time.            Last Labs:  CBC -  Lab Results   Component Value Date    WBC 5.3 04/02/2024    HGB 12.5 04/02/2024    HCT 38.0 04/02/2024    MCV 82 04/02/2024     04/02/2024       CMP -  Lab Results   Component Value Date    CALCIUM 8.3 (L) 04/02/2024    PROT 6.5 04/02/2024    ALBUMIN 3.9 04/02/2024    AST 16 04/02/2024    ALT 13 04/02/2024    ALKPHOS 58 04/02/2024    BILITOT 0.3 04/02/2024       LIPID PANEL -   Lab Results   Component Value Date    CHOL 175 01/06/2023    HDL 58.1 01/06/2023    CHHDL 3.0 01/06/2023       RENAL FUNCTION PANEL -   Lab Results   Component Value Date    GLUCOSE 98 04/02/2024     (L) 04/02/2024    K 3.6 04/02/2024     04/02/2024    CO2 22 04/02/2024    ANIONGAP 12 04/02/2024    BUN 8 04/02/2024    CREATININE 0.68 04/02/2024    CALCIUM 8.3 (L) 04/02/2024    ALBUMIN 3.9 04/02/2024        Lab Results   Component Value Date    BNP 23 05/21/2023       Last Cardiology Tests:  ECG:  ECG 12 lead 04/02/2024    Today.  Normal sinus rhythm.  Normal axis.  Corrected QT  interval 450 ms.  Short UT interval.  Stress Test:  Nuclear Stress Test 06/01/2023      Lab review: I have personally reviewed the laboratory result(s) see above    Assessment/Plan   Diagnoses and all orders for this visit:  Paroxysmal SVT (supraventricular tachycardia) (CMS-HCC)  PAC (premature atrial contraction)  Palpitations  Inappropriate sinus tachycardia (CMS-HCC)  BMI 30.0-30.9,adult  Right foot injury, initial encounter  Encounter for medication review and counseling  Encounter to discuss treatment options      PSVT. 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 S/p remote ablation of the pathway of AV node to prevent atypical AVNRT. Recent EP evaluation did not show any inducible AVNRT.  Now with recurrent palpitation. By history may be atrial tachycardia or other SVT. Ordered two weeks event monitor to document arrhythmia. Will discontinue metoprolol. Start diltiazem  mg daily. Increase Mg Ox to 400 mg BID. If tachycardia, then EP ablation. Preprocedural evaluation performed for EP possible SVT ablation. Econsent obtained.  Hyperthyroidism and Graves' disease. Stable. Chronic.   PACs and PVCs by previous monitor. Normal LV function by echocardiogram December 2020.   Paroxysmal atrial fibrillation, reported documentation of single episode during initial diagnosis of Graves' disease. No anticoagulation indicated.  Iron deficiency anemia. Chronic and stable by report.  Atypical chest pain. Negative stress test June 2023  COVID recovered.  Overweight   AHA recommendations for exercise, diet, and behavioral modification reviewed with pt.     Counseling over 50% visit performed. The patient and I discussed the mechanism of arrhythmia, ECG, previous ablations and SVT, possible atrial tachycardia as no inducible arrhythmia post ablation in past, calcium channel blocker  versus beta-blocker, indications for and types of medications, discussion if and what medication refills needed, treatment options, risks, benefits, and imponderables. American Heart Association lifestyle changes and behavioral modification discussed. All questions answered in detail.  Patient appreciative of care.         Mireille Montiel MD

## 2024-06-21 NOTE — PATIENT INSTRUCTIONS
Continue same medications/treatment.  Patient educated on proper medication use.  Patient educated on risk factor modification.  Please bring any lab results from other providers/physicians to your next appointment.    Please bring all medicines, vitamins, and herbal supplements with you when you come to the office.    Prescriptions will not be filled unless you are compliant with your follow up appointments or have a follow up appointment scheduled as per instruction of your physician. Refills should be requested at the time of your visit.    STOP metoprolol   START diltiazem 120mg daily  INCREASE magnesium oxide to 400mg twice daily  SCHEDULE 2 week ziopatch  Pending results of the ziopatch, we will schedule EP study with SVT ablation    NASREEN THOMASON RN, AM SCRIBING FOR AND IN THE PRESENCE OF DR. JENNIFER RM MD, FACC, FACP, RS

## 2024-06-24 ENCOUNTER — TELEPHONE (OUTPATIENT)
Dept: CARDIOLOGY | Facility: CLINIC | Age: 29
End: 2024-06-24
Payer: COMMERCIAL

## 2024-06-24 NOTE — TELEPHONE ENCOUNTER
2 week zio patch [id number] approved auth# [id number] valid 6/24--9/24/24 per [facility] portal

## 2024-07-01 ENCOUNTER — APPOINTMENT (OUTPATIENT)
Dept: CARDIOLOGY | Facility: CLINIC | Age: 29
End: 2024-07-01
Payer: COMMERCIAL

## 2024-07-01 DIAGNOSIS — I47.11 INAPPROPRIATE SINUS TACHYCARDIA (CMS-HCC): ICD-10-CM

## 2024-07-01 DIAGNOSIS — I47.10 PAROXYSMAL SVT (SUPRAVENTRICULAR TACHYCARDIA) (CMS-HCC): ICD-10-CM

## 2024-07-01 DIAGNOSIS — R00.2 PALPITATIONS: ICD-10-CM

## 2024-07-01 PROCEDURE — 93246 EXT ECG>7D<15D RECORDING: CPT | Performed by: INTERNAL MEDICINE

## 2024-07-01 PROCEDURE — 93248 EXT ECG>7D<15D REV&INTERPJ: CPT | Performed by: INTERNAL MEDICINE

## 2024-07-11 ENCOUNTER — LAB (OUTPATIENT)
Dept: LAB | Facility: LAB | Age: 29
End: 2024-07-11
Payer: COMMERCIAL

## 2024-07-11 ENCOUNTER — OFFICE VISIT (OUTPATIENT)
Dept: PRIMARY CARE | Facility: CLINIC | Age: 29
End: 2024-07-11
Payer: COMMERCIAL

## 2024-07-11 VITALS
OXYGEN SATURATION: 97 % | TEMPERATURE: 96.4 F | DIASTOLIC BLOOD PRESSURE: 85 MMHG | WEIGHT: 187.8 LBS | BODY MASS INDEX: 30.31 KG/M2 | SYSTOLIC BLOOD PRESSURE: 121 MMHG | HEART RATE: 78 BPM

## 2024-07-11 DIAGNOSIS — E83.51 HYPOCALCEMIA: ICD-10-CM

## 2024-07-11 DIAGNOSIS — E55.9 VITAMIN D DEFICIENCY: ICD-10-CM

## 2024-07-11 DIAGNOSIS — E87.1 HYPONATREMIA: ICD-10-CM

## 2024-07-11 DIAGNOSIS — R03.0 ELEVATED BP WITHOUT DIAGNOSIS OF HYPERTENSION: ICD-10-CM

## 2024-07-11 DIAGNOSIS — E61.1 IRON DEFICIENCY: ICD-10-CM

## 2024-07-11 DIAGNOSIS — Z13.220 LIPID SCREENING: ICD-10-CM

## 2024-07-11 DIAGNOSIS — F41.9 ANXIETY AND DEPRESSION: Primary | ICD-10-CM

## 2024-07-11 DIAGNOSIS — D64.9 ANEMIA, UNSPECIFIED TYPE: ICD-10-CM

## 2024-07-11 DIAGNOSIS — F32.A ANXIETY AND DEPRESSION: Primary | ICD-10-CM

## 2024-07-11 PROBLEM — N76.0 VAGINITIS: Status: RESOLVED | Noted: 2023-06-20 | Resolved: 2024-07-11

## 2024-07-11 PROBLEM — R06.02 SHORTNESS OF BREATH: Status: RESOLVED | Noted: 2023-06-20 | Resolved: 2024-07-11

## 2024-07-11 PROBLEM — R10.30 GROIN PAIN: Status: RESOLVED | Noted: 2023-09-08 | Resolved: 2024-07-11

## 2024-07-11 PROBLEM — M54.9 BACK PAIN WITHOUT RADICULOPATHY: Status: RESOLVED | Noted: 2023-06-20 | Resolved: 2024-07-11

## 2024-07-11 PROBLEM — R07.89 CHEST DISCOMFORT: Status: RESOLVED | Noted: 2023-06-20 | Resolved: 2024-07-11

## 2024-07-11 PROBLEM — B00.1 COLD SORE: Status: RESOLVED | Noted: 2023-06-20 | Resolved: 2024-07-11

## 2024-07-11 PROBLEM — R87.619 ABNORMAL PAP SMEAR OF CERVIX: Status: RESOLVED | Noted: 2023-06-20 | Resolved: 2024-07-11

## 2024-07-11 PROBLEM — Z71.89 ENCOUNTER FOR MEDICATION REVIEW AND COUNSELING: Status: RESOLVED | Noted: 2024-06-21 | Resolved: 2024-07-11

## 2024-07-11 PROBLEM — T14.8XXA HEMATOMA: Status: RESOLVED | Noted: 2023-06-20 | Resolved: 2024-07-11

## 2024-07-11 PROBLEM — M54.50 LOWER BACK PAIN: Status: RESOLVED | Noted: 2023-06-20 | Resolved: 2024-07-11

## 2024-07-11 PROBLEM — S33.5XXA LUMBAR SPRAIN: Status: RESOLVED | Noted: 2023-06-20 | Resolved: 2024-07-11

## 2024-07-11 PROBLEM — Z71.89 ENCOUNTER TO DISCUSS TREATMENT OPTIONS: Status: RESOLVED | Noted: 2024-06-21 | Resolved: 2024-07-11

## 2024-07-11 PROBLEM — H10.89 OTHER CONJUNCTIVITIS: Status: RESOLVED | Noted: 2023-06-20 | Resolved: 2024-07-11

## 2024-07-11 PROBLEM — N89.8 VAGINAL ITCHING: Status: RESOLVED | Noted: 2023-06-20 | Resolved: 2024-07-11

## 2024-07-11 PROBLEM — S30.1XXA HEMATOMA OF GROIN: Status: RESOLVED | Noted: 2023-09-08 | Resolved: 2024-07-11

## 2024-07-11 PROBLEM — N89.8 VAGINAL ODOR: Status: RESOLVED | Noted: 2023-06-20 | Resolved: 2024-07-11

## 2024-07-11 PROBLEM — S99.921A RIGHT FOOT INJURY, INITIAL ENCOUNTER: Status: RESOLVED | Noted: 2023-06-20 | Resolved: 2024-07-11

## 2024-07-11 LAB
25(OH)D3 SERPL-MCNC: 23 NG/ML (ref 30–100)
ANION GAP SERPL CALC-SCNC: 13 MMOL/L (ref 10–20)
BUN SERPL-MCNC: 10 MG/DL (ref 6–23)
CALCIUM SERPL-MCNC: 9 MG/DL (ref 8.6–10.3)
CHLORIDE SERPL-SCNC: 103 MMOL/L (ref 98–107)
CHOLEST SERPL-MCNC: 189 MG/DL (ref 0–199)
CHOLESTEROL/HDL RATIO: 3.3
CO2 SERPL-SCNC: 25 MMOL/L (ref 21–32)
CREAT SERPL-MCNC: 0.75 MG/DL (ref 0.5–1.05)
EGFRCR SERPLBLD CKD-EPI 2021: >90 ML/MIN/1.73M*2
FERRITIN SERPL-MCNC: 28 NG/ML (ref 8–150)
GLUCOSE SERPL-MCNC: 74 MG/DL (ref 74–99)
HDLC SERPL-MCNC: 56.7 MG/DL
IRON SATN MFR SERPL: 31 % (ref 25–45)
IRON SERPL-MCNC: 167 UG/DL (ref 35–150)
LDLC SERPL CALC-MCNC: 87 MG/DL
NON HDL CHOLESTEROL: 132 MG/DL (ref 0–149)
POTASSIUM SERPL-SCNC: 4.3 MMOL/L (ref 3.5–5.3)
SODIUM SERPL-SCNC: 137 MMOL/L (ref 136–145)
TIBC SERPL-MCNC: 540 UG/DL (ref 240–445)
TRIGL SERPL-MCNC: 226 MG/DL (ref 0–149)
UIBC SERPL-MCNC: 373 UG/DL (ref 110–370)
VLDL: 45 MG/DL (ref 0–40)

## 2024-07-11 PROCEDURE — 36415 COLL VENOUS BLD VENIPUNCTURE: CPT

## 2024-07-11 PROCEDURE — 83550 IRON BINDING TEST: CPT

## 2024-07-11 PROCEDURE — 3008F BODY MASS INDEX DOCD: CPT | Performed by: NURSE PRACTITIONER

## 2024-07-11 PROCEDURE — 80061 LIPID PANEL: CPT

## 2024-07-11 PROCEDURE — 1036F TOBACCO NON-USER: CPT | Performed by: NURSE PRACTITIONER

## 2024-07-11 PROCEDURE — 80048 BASIC METABOLIC PNL TOTAL CA: CPT

## 2024-07-11 PROCEDURE — 99214 OFFICE O/P EST MOD 30 MIN: CPT | Performed by: NURSE PRACTITIONER

## 2024-07-11 PROCEDURE — 82306 VITAMIN D 25 HYDROXY: CPT

## 2024-07-11 PROCEDURE — 83540 ASSAY OF IRON: CPT

## 2024-07-11 PROCEDURE — 82728 ASSAY OF FERRITIN: CPT

## 2024-07-11 ASSESSMENT — PATIENT HEALTH QUESTIONNAIRE - PHQ9
1. LITTLE INTEREST OR PLEASURE IN DOING THINGS: NOT AT ALL
2. FEELING DOWN, DEPRESSED OR HOPELESS: NOT AT ALL
SUM OF ALL RESPONSES TO PHQ9 QUESTIONS 1 AND 2: 0

## 2024-07-11 ASSESSMENT — ENCOUNTER SYMPTOMS: DEPRESSION: 1

## 2024-07-11 NOTE — PROGRESS NOTES
Problem List Items Addressed This Visit       Anemia    Current Assessment & Plan     Overdue for rechecks   Off supp         Relevant Orders    Iron and TIBC    Ferritin    Anxiety and depression - Primary    Overview     S/p zoloft and lexapro wo relief of sx          Current Assessment & Plan     Gene Sight sent  - 10 day fu  Cont counseling   No ideation         Vitamin D deficiency    Current Assessment & Plan     Overdue for recheck  She is off supp         Relevant Orders    Vitamin D 25-Hydroxy,Total (for eval of Vitamin D levels)     Other Visit Diagnoses       Hyponatremia        4/2024 = 135  recheck    Relevant Orders    Basic metabolic panel    Hypocalcemia        4/2024 = 8.3  recheck    Relevant Orders    Basic metabolic panel    Lipid screening        check fasting    Relevant Orders    Lipid Panel    Elevated BP without diagnosis of hypertension        sees Dr Montiel  on diltiazem SVT  she is concerned with elevated BP  10 day cuff check and review             Subjective   Patient ID: Mary Jackson is a 28 y.o. female who presents for Depression (Gene sight testing /Pt has tried multiple medications with increase in dosage /Doesn't want to keep increasing medications/Discuss blood pressure).  Depression    ? Anxiety, depression, PTSD  Zoloft titrated up wo relief   Mom on paxil   Lexapro 10 mg x about 1.5 months  Started counseling couple weeks ago  Safe relationships now  Mom on paxil    BP  Feels always elevated  Doesn't check at home  No caffeine  ALTAGRACIA  Not much prepared food  Working on hydrating better  Wearing zio for another week  - per cards Dr Montiel     Component      Latest Ref Rng 4/2/2024   LEUKOCYTES (10*3/UL) IN BLOOD BY AUTOMATED COUNT, [place]      4.4 - 11.3 x10*3/uL 5.3    nRBC      0.0 - 0.0 /100 WBCs 0.0    ERYTHROCYTES (10*6/UL) IN BLOOD BY AUTOMATED COUNT, [place]      4.00 - 5.20 x10*6/uL 4.61    HEMOGLOBIN      12.0 - 16.0 g/dL 12.5    HEMATOCRIT      36.0 - 46.0 % 38.0     MCV      80 - 100 fL 82    MCH      26.0 - 34.0 pg 27.1    MCHC      32.0 - 36.0 g/dL 32.9    RED CELL DISTRIBUTION WIDTH      11.5 - 14.5 % 12.2    PLATELETS (10*3/UL) IN BLOOD AUTOMATED COUNT, [facility]      150 - 450 x10*3/uL 252    NEUTROPHILS/100 LEUKOCYTES IN BLOOD BY AUTOMATED COUNT, [facility]      40.0 - 80.0 % 60.0    Immature Granulocytes %, Automated      0.0 - 0.9 % 0.2    Lymphocytes %      13.0 - 44.0 % 27.0    Monocytes %      2.0 - 10.0 % 9.2    Eosinophils %      0.0 - 6.0 % 3.0    Basophils %      0.0 - 2.0 % 0.6    NEUTROPHILS (10*3/UL) IN BLOOD BY AUTOMATED COUNT, [facility]      1.20 - 7.70 x10*3/uL 3.20    Immature Granulocytes Absolute, Automated      0.00 - 0.70 x10*3/uL 0.01    Lymphocytes Absolute      1.20 - 4.80 x10*3/uL 1.44    Monocytes Absolute      0.10 - 1.00 x10*3/uL 0.49    Eosinophils Absolute      0.00 - 0.70 x10*3/uL 0.16    Basophils Absolute      0.00 - 0.10 x10*3/uL 0.03    GLUCOSE      74 - 99 mg/dL 98    SODIUM      136 - 145 mmol/L 135 (L)    POTASSIUM      3.5 - 5.3 mmol/L 3.6    CHLORIDE      98 - 107 mmol/L 105    Bicarbonate      21 - 32 mmol/L 22    Anion Gap      10 - 20 mmol/L 12    Blood Urea Nitrogen      6 - 23 mg/dL 8    Creatinine      0.50 - 1.05 mg/dL 0.68    EGFR      >60 mL/min/1.73m*2 >90    Calcium      8.6 - 10.3 mg/dL 8.3 (L)    Albumin      3.4 - 5.0 g/dL 3.9    Alkaline Phosphatase      33 - 110 U/L 58    Total Protein      6.4 - 8.2 g/dL 6.5    AST      9 - 39 U/L 16    Bilirubin Total      0.0 - 1.2 mg/dL 0.3    ALT      7 - 45 U/L 13    MAGNESIUM      1.60 - 2.40 mg/dL 2.04    HCG, Beta-Quantitative      <5 mIU/mL <2    Troponin I, High Sensitivity      0 - 13 ng/L <3    Troponin I, High Sensitivity       <3    D-Dimer, Quantitative VTE Exclusion      <=500 ng/mL          Review of Systems   Psychiatric/Behavioral:  Positive for depression.    All other systems reviewed and are negative.      BP Readings from Last 3 Encounters:   07/11/24  "121/85   06/21/24 110/72   06/13/24 (!) 164/105      Wt Readings from Last 3 Encounters:   07/11/24 85.2 kg (187 lb 12.8 oz)   06/21/24 86.6 kg (191 lb)   06/13/24 83.9 kg (185 lb)      BMI:   Estimated body mass index is 30.31 kg/m² as calculated from the following:    Height as of 6/21/24: 1.676 m (5' 6\").    Weight as of this encounter: 85.2 kg (187 lb 12.8 oz).    Objective   Physical Exam  Constitutional:       General: She is not in acute distress.  HENT:      Head: Normocephalic and atraumatic.      Nose: Nose normal.      Mouth/Throat:      Mouth: Mucous membranes are moist.   Eyes:      Extraocular Movements: Extraocular movements intact.      Conjunctiva/sclera: Conjunctivae normal.   Cardiovascular:      Rate and Rhythm: Normal rate and regular rhythm.      Pulses: Normal pulses.   Pulmonary:      Effort: Pulmonary effort is normal.      Breath sounds: Normal breath sounds.   Musculoskeletal:         General: Normal range of motion.      Cervical back: Normal range of motion and neck supple.   Skin:     General: Skin is warm and dry.   Neurological:      General: No focal deficit present.      Mental Status: She is alert.   Psychiatric:         Mood and Affect: Mood normal.       "

## 2024-07-12 DIAGNOSIS — R79.0 SERUM IRON RAISED: ICD-10-CM

## 2024-07-12 DIAGNOSIS — E78.1 HYPERTRIGLYCERIDEMIA: ICD-10-CM

## 2024-07-12 DIAGNOSIS — E55.9 VITAMIN D DEFICIENCY: Primary | ICD-10-CM

## 2024-07-12 RX ORDER — ERGOCALCIFEROL 1.25 MG/1
50000 CAPSULE ORAL
Qty: 6 CAPSULE | Refills: 0 | Status: SHIPPED | OUTPATIENT
Start: 2024-07-14 | End: 2024-08-19

## 2024-07-18 ENCOUNTER — APPOINTMENT (OUTPATIENT)
Dept: PRIMARY CARE | Facility: CLINIC | Age: 29
End: 2024-07-18
Payer: COMMERCIAL

## 2024-07-18 VITALS
BODY MASS INDEX: 30.31 KG/M2 | DIASTOLIC BLOOD PRESSURE: 84 MMHG | HEART RATE: 79 BPM | WEIGHT: 187.8 LBS | TEMPERATURE: 95.5 F | SYSTOLIC BLOOD PRESSURE: 124 MMHG | OXYGEN SATURATION: 97 %

## 2024-07-18 DIAGNOSIS — R79.0 SERUM IRON RAISED: ICD-10-CM

## 2024-07-18 DIAGNOSIS — E78.1 HYPERTRIGLYCERIDEMIA: ICD-10-CM

## 2024-07-18 DIAGNOSIS — E87.1 HYPONATREMIA: ICD-10-CM

## 2024-07-18 DIAGNOSIS — F41.9 ANXIETY AND DEPRESSION: Primary | ICD-10-CM

## 2024-07-18 DIAGNOSIS — E83.51 HYPOCALCEMIA: ICD-10-CM

## 2024-07-18 DIAGNOSIS — F32.A ANXIETY AND DEPRESSION: Primary | ICD-10-CM

## 2024-07-18 DIAGNOSIS — E55.9 VITAMIN D DEFICIENCY: ICD-10-CM

## 2024-07-18 PROCEDURE — 1036F TOBACCO NON-USER: CPT | Performed by: NURSE PRACTITIONER

## 2024-07-18 PROCEDURE — 99213 OFFICE O/P EST LOW 20 MIN: CPT | Performed by: NURSE PRACTITIONER

## 2024-07-18 RX ORDER — VENLAFAXINE 25 MG/1
25 TABLET ORAL DAILY
Qty: 30 TABLET | Refills: 2 | Status: SHIPPED | OUTPATIENT
Start: 2024-07-18 | End: 2024-07-18 | Stop reason: WASHOUT

## 2024-07-18 RX ORDER — BUPROPION HYDROCHLORIDE 75 MG/1
75 TABLET ORAL DAILY
Qty: 30 TABLET | Refills: 3 | Status: SHIPPED | OUTPATIENT
Start: 2024-07-18

## 2024-07-18 ASSESSMENT — PATIENT HEALTH QUESTIONNAIRE - PHQ9
1. LITTLE INTEREST OR PLEASURE IN DOING THINGS: NOT AT ALL
SUM OF ALL RESPONSES TO PHQ9 QUESTIONS 1 AND 2: 0
2. FEELING DOWN, DEPRESSED OR HOPELESS: NOT AT ALL

## 2024-07-18 NOTE — PROGRESS NOTES
Problem List Items Addressed This Visit       Anxiety and depression - Primary    Overview     S/p zoloft and lexapro wo relief of sx   7/2024 Gene Sight: prozac & paxil significant G-D interaction. Celexa, lexapro & zoloft moderate G-D interaction (moderately reduced efficacy). No G-D interaction: elavil, wellbutrin, anafranil, norpramin, pristiq, sinequan, cymbalta, luvox, tofranil, fetzima, remeron, pamelor, emsam, desyrel, effexor (risk QT prolongation), vibryd, trintellix  7/18/24: begin wean lexapro with low dose wellbutrin. Ok to titrate +/-. Fu 30 days, prn. Continue counseling         Relevant Medications    buPROPion (Wellbutrin) 75 mg tablet    Hypertriglyceridemia    Overview     7/2024 = 226         Current Assessment & Plan     Start fish oil with nutritional changes and repeat in 3 months         Serum iron raised    Overview     ? OCP  Repeat levels in a couple weeks         Vitamin D deficiency    Overview     7/2024 = 23         Current Assessment & Plan     She's on high dose, then will start 2000 units every day with lab recheck          Other Visit Diagnoses       Hyponatremia        resolved    Hypocalcemia        resolved             Subjective   Patient ID: Mary Jackson is a 28 y.o. female who presents for Follow-up ([id number]/).  HPI  Anxiety & depression  Lexapro & zoloft wo relief of sx  Reviewed Gene Sight results    BP  Didn't bring home cuff  Currently wearing zio  She plans to fu with EP cards    Copied from my    Anemia    Current Assessment & Plan     Overdue for rechecks   Off supp         Relevant Orders    Iron and TIBC    Ferritin    Anxiety and depression - Primary    Overview     S/p zoloft and lexapro wo relief of sx          Current Assessment & Plan     Gene Sight sent  - 10 day fu  Cont counseling   No ideation         Vitamin D deficiency    Current Assessment & Plan     Overdue for recheck  She is off supp         Relevant Orders    Vitamin D 25-Hydroxy,Total (for eval  "of Vitamin D levels)     Other Visit Diagnoses       Hyponatremia        4/2024 = 135  recheck    Relevant Orders    Basic metabolic panel    Hypocalcemia        4/2024 = 8.3  recheck    Relevant Orders    Basic metabolic panel    Lipid screening        check fasting    Relevant Orders    Lipid Panel    Elevated BP without diagnosis of hypertension        sees Dr Montiel  on diltiazem SVT  she is concerned with elevated BP  10 day cuff check and review     Component      Latest Ref Rng 7/11/2024   GLUCOSE      74 - 99 mg/dL 74    SODIUM      136 - 145 mmol/L 137    POTASSIUM      3.5 - 5.3 mmol/L 4.3    CHLORIDE      98 - 107 mmol/L 103    Bicarbonate      21 - 32 mmol/L 25    Anion Gap      10 - 20 mmol/L 13    Blood Urea Nitrogen      6 - 23 mg/dL 10    Creatinine      0.50 - 1.05 mg/dL 0.75    EGFR      >60 mL/min/1.73m*2 >90    Calcium      8.6 - 10.3 mg/dL 9.0    CHOLESTEROL      0 - 199 mg/dL 189    HDL CHOLESTEROL      mg/dL 56.7    Cholesterol/HDL Ratio 3.3    LDL Calculated      <=99 mg/dL 87    VLDL      0 - 40 mg/dL 45 (H)    TRIGLYCERIDES      0 - 149 mg/dL 226 (H)    Non HDL Cholesterol      0 - 149 mg/dL 132    IRON      35 - 150 ug/dL 167 (H)    UIBC      110 - 370 ug/dL 373 (H)    TIBC      240 - 445 ug/dL 540 (H)    % Saturation      25 - 45 % 31    Vitamin D, 25-Hydroxy, Total      30 - 100 ng/mL 23 (L)    FERRITIN      8 - 150 ng/mL 28         Review of Systems   All other systems reviewed and are negative.      BP Readings from Last 3 Encounters:   07/18/24 124/84   07/11/24 121/85   06/21/24 110/72      Wt Readings from Last 3 Encounters:   07/18/24 85.2 kg (187 lb 12.8 oz)   07/11/24 85.2 kg (187 lb 12.8 oz)   06/21/24 86.6 kg (191 lb)      BMI:   Estimated body mass index is 30.31 kg/m² as calculated from the following:    Height as of 6/21/24: 1.676 m (5' 6\").    Weight as of this encounter: 85.2 kg (187 lb 12.8 oz).    Objective   Physical Exam  Constitutional:       General: She is not in " acute distress.  HENT:      Head: Normocephalic and atraumatic.      Nose: Nose normal.      Mouth/Throat:      Mouth: Mucous membranes are moist.   Eyes:      Extraocular Movements: Extraocular movements intact.      Conjunctiva/sclera: Conjunctivae normal.   Pulmonary:      Effort: Pulmonary effort is normal.   Musculoskeletal:         General: Normal range of motion.      Cervical back: Normal range of motion and neck supple.   Skin:     Findings: No rash.   Neurological:      General: No focal deficit present.      Mental Status: She is alert.   Psychiatric:         Mood and Affect: Mood normal.

## 2024-07-29 ENCOUNTER — PATIENT MESSAGE (OUTPATIENT)
Dept: CARDIOLOGY | Facility: CLINIC | Age: 29
End: 2024-07-29
Payer: COMMERCIAL

## 2024-08-03 ENCOUNTER — CLINICAL SUPPORT (OUTPATIENT)
Dept: EMERGENCY MEDICINE | Facility: HOSPITAL | Age: 29
End: 2024-08-03
Payer: COMMERCIAL

## 2024-08-03 ENCOUNTER — APPOINTMENT (OUTPATIENT)
Dept: RADIOLOGY | Facility: HOSPITAL | Age: 29
End: 2024-08-03
Payer: COMMERCIAL

## 2024-08-03 ENCOUNTER — HOSPITAL ENCOUNTER (EMERGENCY)
Facility: HOSPITAL | Age: 29
Discharge: HOME | End: 2024-08-03
Attending: EMERGENCY MEDICINE
Payer: COMMERCIAL

## 2024-08-03 VITALS
RESPIRATION RATE: 15 BRPM | HEIGHT: 68 IN | BODY MASS INDEX: 28.79 KG/M2 | DIASTOLIC BLOOD PRESSURE: 88 MMHG | OXYGEN SATURATION: 97 % | HEART RATE: 76 BPM | SYSTOLIC BLOOD PRESSURE: 132 MMHG | WEIGHT: 190 LBS

## 2024-08-03 DIAGNOSIS — I47.19 AVNRT (AV NODAL RE-ENTRY TACHYCARDIA) (CMS-HCC): Primary | ICD-10-CM

## 2024-08-03 LAB
ALBUMIN SERPL BCP-MCNC: 4.3 G/DL (ref 3.4–5)
ALP SERPL-CCNC: 58 U/L (ref 33–110)
ALT SERPL W P-5'-P-CCNC: 29 U/L (ref 7–45)
ANION GAP SERPL CALC-SCNC: 18 MMOL/L (ref 10–20)
AST SERPL W P-5'-P-CCNC: 28 U/L (ref 9–39)
BASOPHILS # BLD AUTO: 0.03 X10*3/UL (ref 0–0.1)
BASOPHILS NFR BLD AUTO: 0.5 %
BILIRUB SERPL-MCNC: 0.3 MG/DL (ref 0–1.2)
BUN SERPL-MCNC: 11 MG/DL (ref 6–23)
CALCIUM SERPL-MCNC: 9.4 MG/DL (ref 8.6–10.6)
CHLORIDE SERPL-SCNC: 104 MMOL/L (ref 98–107)
CO2 SERPL-SCNC: 23 MMOL/L (ref 21–32)
CREAT SERPL-MCNC: 0.78 MG/DL (ref 0.5–1.05)
EGFRCR SERPLBLD CKD-EPI 2021: >90 ML/MIN/1.73M*2
EOSINOPHIL # BLD AUTO: 0.19 X10*3/UL (ref 0–0.7)
EOSINOPHIL NFR BLD AUTO: 3.2 %
ERYTHROCYTE [DISTWIDTH] IN BLOOD BY AUTOMATED COUNT: 12.3 % (ref 11.5–14.5)
GLUCOSE SERPL-MCNC: 111 MG/DL (ref 74–99)
HCT VFR BLD AUTO: 40.5 % (ref 36–46)
HGB BLD-MCNC: 13 G/DL (ref 12–16)
IMM GRANULOCYTES # BLD AUTO: 0.01 X10*3/UL (ref 0–0.7)
IMM GRANULOCYTES NFR BLD AUTO: 0.2 % (ref 0–0.9)
LYMPHOCYTES # BLD AUTO: 2.47 X10*3/UL (ref 1.2–4.8)
LYMPHOCYTES NFR BLD AUTO: 41.4 %
MAGNESIUM SERPL-MCNC: 2 MG/DL (ref 1.6–2.4)
MCH RBC QN AUTO: 27.5 PG (ref 26–34)
MCHC RBC AUTO-ENTMCNC: 32.1 G/DL (ref 32–36)
MCV RBC AUTO: 86 FL (ref 80–100)
MONOCYTES # BLD AUTO: 0.43 X10*3/UL (ref 0.1–1)
MONOCYTES NFR BLD AUTO: 7.2 %
NEUTROPHILS # BLD AUTO: 2.83 X10*3/UL (ref 1.2–7.7)
NEUTROPHILS NFR BLD AUTO: 47.5 %
NRBC BLD-RTO: 0 /100 WBCS (ref 0–0)
PLATELET # BLD AUTO: 286 X10*3/UL (ref 150–450)
POTASSIUM SERPL-SCNC: 4 MMOL/L (ref 3.5–5.3)
PREGNANCY TEST URINE, POC: NEGATIVE
PROT SERPL-MCNC: 7.1 G/DL (ref 6.4–8.2)
RBC # BLD AUTO: 4.73 X10*6/UL (ref 4–5.2)
SODIUM SERPL-SCNC: 141 MMOL/L (ref 136–145)
WBC # BLD AUTO: 6 X10*3/UL (ref 4.4–11.3)

## 2024-08-03 PROCEDURE — 96360 HYDRATION IV INFUSION INIT: CPT

## 2024-08-03 PROCEDURE — 93010 ELECTROCARDIOGRAM REPORT: CPT | Performed by: EMERGENCY MEDICINE

## 2024-08-03 PROCEDURE — 93005 ELECTROCARDIOGRAM TRACING: CPT

## 2024-08-03 PROCEDURE — 80053 COMPREHEN METABOLIC PANEL: CPT

## 2024-08-03 PROCEDURE — 71045 X-RAY EXAM CHEST 1 VIEW: CPT | Mod: FOREIGN READ | Performed by: RADIOLOGY

## 2024-08-03 PROCEDURE — 85025 COMPLETE CBC W/AUTO DIFF WBC: CPT

## 2024-08-03 PROCEDURE — 99284 EMERGENCY DEPT VISIT MOD MDM: CPT | Performed by: EMERGENCY MEDICINE

## 2024-08-03 PROCEDURE — 83735 ASSAY OF MAGNESIUM: CPT | Performed by: EMERGENCY MEDICINE

## 2024-08-03 PROCEDURE — 99283 EMERGENCY DEPT VISIT LOW MDM: CPT | Mod: 25

## 2024-08-03 PROCEDURE — 36415 COLL VENOUS BLD VENIPUNCTURE: CPT

## 2024-08-03 PROCEDURE — 71045 X-RAY EXAM CHEST 1 VIEW: CPT

## 2024-08-03 PROCEDURE — 2500000004 HC RX 250 GENERAL PHARMACY W/ HCPCS (ALT 636 FOR OP/ED): Mod: SE

## 2024-08-03 RX ADMIN — SODIUM CHLORIDE, POTASSIUM CHLORIDE, SODIUM LACTATE AND CALCIUM CHLORIDE 1000 ML: 600; 310; 30; 20 INJECTION, SOLUTION INTRAVENOUS at 03:50

## 2024-08-03 ASSESSMENT — LIFESTYLE VARIABLES
EVER FELT BAD OR GUILTY ABOUT YOUR DRINKING: NO
TOTAL SCORE: 0
EVER HAD A DRINK FIRST THING IN THE MORNING TO STEADY YOUR NERVES TO GET RID OF A HANGOVER: NO
HAVE YOU EVER FELT YOU SHOULD CUT DOWN ON YOUR DRINKING: NO
HAVE PEOPLE ANNOYED YOU BY CRITICIZING YOUR DRINKING: NO

## 2024-08-03 ASSESSMENT — COLUMBIA-SUICIDE SEVERITY RATING SCALE - C-SSRS
1. IN THE PAST MONTH, HAVE YOU WISHED YOU WERE DEAD OR WISHED YOU COULD GO TO SLEEP AND NOT WAKE UP?: NO
2. HAVE YOU ACTUALLY HAD ANY THOUGHTS OF KILLING YOURSELF?: NO
6. HAVE YOU EVER DONE ANYTHING, STARTED TO DO ANYTHING, OR PREPARED TO DO ANYTHING TO END YOUR LIFE?: NO

## 2024-08-03 NOTE — ED PROVIDER NOTES
HPI   Chief Complaint   Patient presents with    Palpitations       HPI    Patient is a 28 year female with a history of SVT s/p 2 prior ablations, depression, anxiety who presented to the ED with a chief complaint of SVT. The patient was drinking ETOH at around 12:30am and noticed an episode of SVT coming on. She recorded her heart rate on the phone showing several readings in the 210s. She endorses chest pain at the time of the episode. She did not take any medications to try and control the rate. On presentation, the patient's heart rate is 130s, EKG shows sinus tachycardia. The patient reports that she no longer has chest pain. Patient states that her heart rate normally runs in the 120s.    She denies SOB, difficulty breathing, chest pain, LOC, headache, Nausea, Vomiting. Endorses drinking 8 drinks this evening. Denies cocaine and other drug use.     Patient takes Cardizem, metoprolol succinate, Wellbutrin and LARID contraceptives.     Patient History   Past Medical History:   Diagnosis Date    12 weeks gestation of pregnancy ([facility]) 12/20/2019    12 weeks gestation of pregnancy    Acute upper respiratory infection, unspecified 10/16/2019    Viral upper respiratory tract infection    Arrhythmia     Chronic sinusitis, unspecified 03/16/2020    Other sinusitis    Encounter for immunization 11/25/2020    Encounter for vaccination    Encounter for immunization 01/06/2021    Encounter for vaccination    Encounter for supervision of normal pregnancy, unspecified, unspecified trimester ([facility]) 07/10/2020    Prenatal care    Endocrine, nutritional and metabolic diseases complicating pregnancy, unspecified trimester ([facility]) 06/18/2020    Thyroid disease in pregnancy    Other chest pain 10/02/2018    Atypical chest pain    Other conditions influencing health status 04/19/2019    History of pregnancy    Personal history of other diseases of the respiratory system 03/09/2020    History of sore throat    Personal  history of other diseases of the respiratory system 10/16/2019    History of sore throat    Personal history of other infectious and parasitic diseases 03/09/2020    History of viral infection    Personal history of other specified conditions 04/01/2022    History of palpitations    Personal history of other specified conditions 03/19/2019    History of palpitations    Personal history of other specified conditions 05/03/2021    History of chest pain    Sacrococcygeal disorders, not elsewhere classified 05/10/2019    Tail bone pain     Past Surgical History:   Procedure Laterality Date    ABLATION OF DYSRHYTHMIC FOCUS      HERNIA REPAIR  07/27/2018    Hernia Repair    OTHER SURGICAL HISTORY  09/24/2021    Catheter ablation    OTHER SURGICAL HISTORY  05/03/2021    Eye surgery    OTHER SURGICAL HISTORY  05/03/2021    Hernia repair    OTHER SURGICAL HISTORY  05/03/2021    Tonsillectomy    TONSILLECTOMY  07/27/2018    Tonsillectomy     Family History   Problem Relation Name Age of Onset    Anxiety disorder Mother      Thyroid disease Other Paternal GGM     Thyroid disease Other Cousin     Thyroid disease Paternal Grandmother Marianne     Asthma Brother Bj      Social History     Tobacco Use    Smoking status: Former     Types: Cigarettes    Smokeless tobacco: Never   Substance Use Topics    Alcohol use: Yes    Drug use: Not Currently     Types: Marijuana       Physical Exam   ED Triage Vitals   Temp Heart Rate Respirations BP   -- 08/03/24 0136 08/03/24 0136 08/03/24 0136    (!) 131 18 (!) 150/103      Pulse Ox Temp src Heart Rate Source Patient Position   08/03/24 0136 -- -- 08/03/24 0223   97 %   Lying      BP Location FiO2 (%)     08/03/24 0223 --     Right arm        Physical Exam  Constitutional:       General: She is not in acute distress.     Appearance: Normal appearance. She is normal weight. She is not ill-appearing, toxic-appearing or diaphoretic.   HENT:      Head: Normocephalic and atraumatic.    Cardiovascular:      Rate and Rhythm: Regular rhythm. Tachycardia present.      Pulses: Normal pulses.      Heart sounds: Normal heart sounds. No murmur heard.     No friction rub.   Pulmonary:      Effort: Pulmonary effort is normal. No respiratory distress.      Breath sounds: Normal breath sounds. No stridor. No wheezing, rhonchi or rales.   Chest:      Chest wall: No tenderness.   Abdominal:      General: Abdomen is flat. There is no distension.      Palpations: Abdomen is soft. There is no mass.      Tenderness: There is no abdominal tenderness. There is no guarding or rebound.      Hernia: No hernia is present.   Musculoskeletal:         General: No swelling.      Right lower leg: No edema.      Left lower leg: No edema.   Skin:     General: Skin is warm and dry.   Neurological:      General: No focal deficit present.      Mental Status: She is alert and oriented to person, place, and time.      Comments: Patient is inebriated.    Psychiatric:         Mood and Affect: Mood normal.         Behavior: Behavior normal.         Thought Content: Thought content normal.         Judgment: Judgment normal.         ED Course & MDM        EKG shows sinus tachycardia. Patient endorses that the episode has passed and that her heart rate is back to normal baseline. Several things can preciptate SVT including infection, electrolyte abnormalities, pregnancy, and ETOH use. Patient given 1L LR. CXR showed no acute cardiopulmonary process. CMP, magnesium, CBC unremarkable. ETOH use likely the etiology of her episode of SVT. On arrival, patient was no longer in SVT. Workup was negative. Patient was determined to be safe for discharge with cardiology follow up.        Pregnancy Test pending, repeat EKG pending on signout.                         [place] Coma Scale Score: 15                        Medical Decision Making      Procedure  Procedures        ATTENDING ATTESTATION  28-year-old female with a history of SVT status post  ablation x 2 presents to the emergency department this evening following episode of SVT self resolving while she was in the waiting room before she was fully evaluated.  Patient states that she was out with some friends had a couple of drinks states that this is a typical precipitant for her, experienced palpitations her iWatch showed a heart rate just over 200 she had some chest pressure sensation during the event that spontaneously resolved.  Patient tried vagal maneuvers before arrival, they did not work however, while the patient was waiting to be seen it spontaneously converted.  Her EKG here reassuring nonischemic sinus rhythm no sign of preexcitation or Suhail-Parkinson-White no sign of acute ischemia.  We will check electrolytes blood counts observe the patient on telemetry for repeat event and disposition is pending.  In the event that the patient has unremarkable medical evaluation she had no positive symptoms on her review of systems no recent illness fevers no chills no diarrhea no vomiting no falls injuries or trauma, seems to have been spontaneous may be precipitated by drinking which has happened in the past.  Disposition would be to home to follow-up with her cardiologist    I, or a resident under my supervision, was present with the medical student who participated in the documentation of this note.  I have personally seen and examined the patient and performed the medical decision-making components. I have reviewed the medical student documentation and/or resident documentation and verified the findings in the note as written with additions or exceptions as stated in the body of the note.     Hadley Powell  08/03/24 0545

## 2024-08-03 NOTE — PROGRESS NOTES
Patient was handed off to me from the previous team. For full history, physical, and prior ED course, please see previous provider note prior to patient handoff. This is an addendum to the record.    HPI/prior hospital course:   In brief, patient is a 28-year-old female with past medical history of SVT s/p 2 ablations, depression anxiety who presented with concern for SVT.  Reportedly tachycardic to 200s at home, tachycardic to 130s on initial presentation.  Reportedly had vagal episode on the bathroom that resulted in resolution of tachycardia.  Patient handed off pending repeat EKG and urine pregnancy.    Independent Interpretation:  EKG shows a normal rate of 90bpm, sinus rhythm with evidence of ventricular preexcitation consistent with WPW, normal axis,  ms, QRS 96 ms, QTc 501 ms. normal ST and T wave pattern with no evidence of acute ischemia or other acute findings.    Hospital Course/MDM:  Urine pregnancy negative.  Repeat EKG with evidence of WPW but normal rate.  Patient endorses having close cardiology follow-up.  Return precautions and appropriate outpatient follow-up discussed with patient and patient discharged home.    Disposition:  Discharged    Patient seen and discussed with Dr. Singer Martin Bray MD, PhD  Emergency Medicine PGY3

## 2024-08-04 LAB
ATRIAL RATE: 90 BPM
P AXIS: 25 DEGREES
P OFFSET: 200 MS
P ONSET: 154 MS
PR INTERVAL: 106 MS
Q ONSET: 207 MS
QRS COUNT: 15 BEATS
QRS DURATION: 96 MS
QT INTERVAL: 410 MS
QTC CALCULATION(BAZETT): 501 MS
QTC FREDERICIA: 469 MS
R AXIS: 57 DEGREES
T AXIS: 19 DEGREES
T OFFSET: 412 MS
VENTRICULAR RATE: 90 BPM

## 2024-08-12 ENCOUNTER — HOSPITAL ENCOUNTER (OUTPATIENT)
Facility: HOSPITAL | Age: 29
Setting detail: OUTPATIENT SURGERY
End: 2024-08-12
Attending: INTERNAL MEDICINE | Admitting: INTERNAL MEDICINE
Payer: COMMERCIAL

## 2024-08-12 ENCOUNTER — PREP FOR PROCEDURE (OUTPATIENT)
Dept: CARDIOLOGY | Facility: CLINIC | Age: 29
End: 2024-08-12
Payer: COMMERCIAL

## 2024-08-12 ENCOUNTER — PATIENT MESSAGE (OUTPATIENT)
Dept: CARDIOLOGY | Facility: CLINIC | Age: 29
End: 2024-08-12
Payer: COMMERCIAL

## 2024-08-12 DIAGNOSIS — I47.10 PAROXYSMAL SUPRAVENTRICULAR TACHYCARDIA (CMS-HCC): ICD-10-CM

## 2024-08-12 DIAGNOSIS — I47.11 INAPPROPRIATE SINUS TACHYCARDIA (CMS-HCC): ICD-10-CM

## 2024-08-12 DIAGNOSIS — I47.11 INAPPROPRIATE SINUS TACHYCARDIA (CMS-HCC): Primary | ICD-10-CM

## 2024-08-12 DIAGNOSIS — I47.10 PAROXYSMAL SVT (SUPRAVENTRICULAR TACHYCARDIA) (CMS-HCC): ICD-10-CM

## 2024-08-12 DIAGNOSIS — R00.2 PALPITATIONS: ICD-10-CM

## 2024-08-12 DIAGNOSIS — B00.1 HERPES LABIALIS: Primary | ICD-10-CM

## 2024-08-12 DIAGNOSIS — I49.1 PAC (PREMATURE ATRIAL CONTRACTION): ICD-10-CM

## 2024-08-12 RX ORDER — DILTIAZEM HYDROCHLORIDE 240 MG/1
240 CAPSULE, COATED, EXTENDED RELEASE ORAL DAILY
Qty: 90 CAPSULE | Refills: 3 | Status: SHIPPED | OUTPATIENT
Start: 2024-08-12 | End: 2025-08-12

## 2024-08-12 RX ORDER — SODIUM CHLORIDE 9 MG/ML
100 INJECTION, SOLUTION INTRAVENOUS CONTINUOUS
OUTPATIENT
Start: 2024-08-12

## 2024-08-12 RX ORDER — CHLORHEXIDINE GLUCONATE 40 MG/ML
SOLUTION TOPICAL ONCE
OUTPATIENT
Start: 2024-08-12 | End: 2024-08-12

## 2024-08-12 RX ORDER — MUPIROCIN 20 MG/G
1 OINTMENT TOPICAL ONCE
OUTPATIENT
Start: 2024-08-12 | End: 2024-08-12

## 2024-08-12 RX ORDER — VALACYCLOVIR HYDROCHLORIDE 1 G/1
TABLET, FILM COATED ORAL
Qty: 4 TABLET | Refills: 3 | Status: SHIPPED | OUTPATIENT
Start: 2024-08-12

## 2024-08-12 RX ORDER — CEFAZOLIN SODIUM 2 G/100ML
2 INJECTION, SOLUTION INTRAVENOUS ONCE
OUTPATIENT
Start: 2024-08-12 | End: 2024-08-12

## 2024-08-12 NOTE — TELEPHONE ENCOUNTER
Last appointment date:1/21/2021  Next appointment date: 1/20/2022  CURRENT MEDICATIONS:  Current Outpatient Medications   Medication Sig Dispense Refill   • amLODIPine (NORVASC) 10 MG tablet Take 1 tablet by mouth daily. 30 tablet 2   • meclizine (ANTIVERT) 25 MG tablet Take 1 tablet by mouth 3 times daily as needed for Dizziness. 15 tablet 0   • atorvastatin (LIPITOR) 20 MG tablet Take 1 tablet by mouth at bedtime. 90 tablet 1   • etanercept (Enbrel SureClick) 50 MG/ML auto-injector injection Inject 1 pen into the skin 1 day a week. 4 mL 11   • sertraline (ZOLOFT) 100 MG tablet Take 1 tablet by mouth daily. 30 tablet 5   • lamoTRIgine (lamictal xr) 250 MG extended-release tablet Take 2 tablets by mouth daily. 60 tablet 11   • topiramate (TOPAMAX) 25 MG tablet Take 1 tablet by mouth 2 times daily. 60 tablet 11   • ASPIRIN 81 PO Take by mouth daily.     • Calcium 600 MG tablet Take 1 tablet by mouth 2 times daily.     • LORazepam (ATIVAN) 0.5 MG tablet Take 1 tablet by mouth every 6 hours as needed (Seizure, Max 2 mg/24 hours. Hold for sedation). 15 tablet 0   • Cholecalciferol (VITAMIN D3) 125 mcg (5,000 units) tablet Take 1 tablet by mouth daily. 30 tablet 0   • Coenzyme Q10 (CO Q 10 PO) Take 1 tablet by mouth daily.     • BIOTIN FORTE PO Take 1 tablet by mouth daily.      • Multiple Vitamins-Minerals (MULTIVITAMIN ADULTS 50+ PO) Take by mouth daily.        No current facility-administered medications for this visit.      Returned call to patient. We will increase diltiazem to 240mg daily. We will also place orders and schedule loop recorder implant. Patient is agreeable. Will send prescription to physician for signature.     Orders for loop recorder insertion will also be placed and sent to physician for signature.

## 2024-08-15 ENCOUNTER — APPOINTMENT (OUTPATIENT)
Dept: PRIMARY CARE | Facility: CLINIC | Age: 29
End: 2024-08-15
Payer: COMMERCIAL

## 2024-08-15 DIAGNOSIS — F32.A ANXIETY AND DEPRESSION: Primary | ICD-10-CM

## 2024-08-15 DIAGNOSIS — E55.9 VITAMIN D DEFICIENCY: ICD-10-CM

## 2024-08-15 DIAGNOSIS — E78.1 HYPERTRIGLYCERIDEMIA: ICD-10-CM

## 2024-08-15 DIAGNOSIS — R79.0 SERUM IRON RAISED: ICD-10-CM

## 2024-08-15 DIAGNOSIS — I47.11 INAPPROPRIATE SINUS TACHYCARDIA (CMS-HCC): ICD-10-CM

## 2024-08-15 DIAGNOSIS — F41.9 ANXIETY AND DEPRESSION: Primary | ICD-10-CM

## 2024-08-15 PROCEDURE — 99213 OFFICE O/P EST LOW 20 MIN: CPT | Performed by: NURSE PRACTITIONER

## 2024-08-15 PROCEDURE — 1036F TOBACCO NON-USER: CPT | Performed by: NURSE PRACTITIONER

## 2024-08-15 RX ORDER — BUPROPION HYDROCHLORIDE 75 MG/1
75 TABLET ORAL 2 TIMES DAILY
Qty: 60 TABLET | Refills: 3 | Status: SHIPPED | OUTPATIENT
Start: 2024-08-15

## 2024-08-15 ASSESSMENT — PATIENT HEALTH QUESTIONNAIRE - PHQ9
SUM OF ALL RESPONSES TO PHQ9 QUESTIONS 1 AND 2: 0
2. FEELING DOWN, DEPRESSED OR HOPELESS: NOT AT ALL
1. LITTLE INTEREST OR PLEASURE IN DOING THINGS: NOT AT ALL

## 2024-08-15 NOTE — ASSESSMENT & PLAN NOTE
One more week of high dose then will start 2000 units every day   Get level rechecked with upcoming labs

## 2024-08-15 NOTE — PROGRESS NOTES
An interactive audio/visual telecommunication system which permits real time communications between the patient (at the originating site) and provider (at the distant site) was utilized to provide this telehealth service.    Verbal consent was requested and obtained from the patient for this telehealth visit.  We have confirmed the patient wishes to see me, Skye Boyer, a board certified family nurse practitioner with an active [facility] license as well as verified the patient's identity and physical location in [place].      Problem List Items Addressed This Visit       Anxiety and depression - Primary    Overview     S/p zoloft and lexapro wo relief of sx   7/2024 Gene Sight: prozac & paxil significant G-D interaction. Celexa, lexapro & zoloft moderate G-D interaction (moderately reduced efficacy). No G-D interaction: elavil, wellbutrin, anafranil, norpramin, pristiq, sinequan, cymbalta, luvox, tofranil, fetzima, remeron, pamelor, emsam, desyrel, effexor (risk QT prolongation), vibryd, trintellix  7/18/24: begin wean lexapro with low dose wellbutrin. Ok to titrate +/-. Fu 30 days, prn. Continue counseling  8/15/24: increase wellbutrin to 75 mg bid. Ok to cont titration          Relevant Medications    buPROPion (Wellbutrin) 75 mg tablet    Hypertriglyceridemia    Overview     7/2024 = 226         Current Assessment & Plan     Hasn't started fish oil yet         Inappropriate sinus tachycardia (CMS-HCC)    Overview     9/12/24 is scheduled for loop insertion. Dr Montiel         Serum iron raised    Overview     ? OCP  Repeat levels in a couple weeks         Vitamin D deficiency    Overview     7/2024 = 23  8/2024 =          Current Assessment & Plan     One more week of high dose then will start 2000 units every day   Get level rechecked with upcoming labs              Subjective   Patient ID: Mary Jackson is a 29 y.o. female who presents for Follow-up (Up dosage of wellbutrin ).  HPI  A&D  Wellbutrin wo SE  - on  for about 3 weeks no wo much difference   Completed wean lexapro    Vit D  Has one more week of high dose     Elevated serum iron  OCP plus iron    Hypertrig  Hasn't started fish oil yet    Component      Latest Ref Rng 7/11/2024   GLUCOSE      74 - 99 mg/dL 74    SODIUM      136 - 145 mmol/L 137    POTASSIUM      3.5 - 5.3 mmol/L 4.3    CHLORIDE      98 - 107 mmol/L 103    Bicarbonate      21 - 32 mmol/L 25    Anion Gap      10 - 20 mmol/L 13    Blood Urea Nitrogen      6 - 23 mg/dL 10    Creatinine      0.50 - 1.05 mg/dL 0.75    EGFR      >60 mL/min/1.73m*2 >90    Calcium      8.6 - 10.3 mg/dL 9.0    CHOLESTEROL      0 - 199 mg/dL 189    HDL CHOLESTEROL      mg/dL 56.7    Cholesterol/HDL Ratio 3.3    LDL Calculated      <=99 mg/dL 87    VLDL      0 - 40 mg/dL 45 (H)    TRIGLYCERIDES      0 - 149 mg/dL 226 (H)    Non HDL Cholesterol      0 - 149 mg/dL 132    IRON      35 - 150 ug/dL 167 (H)    UIBC      110 - 370 ug/dL 373 (H)    TIBC      240 - 445 ug/dL 540 (H)    % Saturation      25 - 45 % 31    Vitamin D, 25-Hydroxy, Total      30 - 100 ng/mL 23 (L)    FERRITIN      8 - 150 ng/mL 28         Copied my 7/18/24 note:  Anxiety and depression - Primary   Overview    S/p zoloft and lexapro wo relief of sx   7/2024 Gene Sight: prozac & paxil significant G-D interaction. Celexa, lexapro & zoloft moderate G-D interaction (moderately reduced efficacy). No G-D interaction: elavil, wellbutrin, anafranil, norpramin, pristiq, sinequan, cymbalta, luvox, tofranil, fetzima, remeron, pamelor, emsam, desyrel, effexor (risk QT prolongation), vibryd, trintellix  7/18/24: begin wean lexapro with low dose wellbutrin. Ok to titrate +/-. Fu 30 days, prn. Continue counseling        Relevant Medications   buPROPion (Wellbutrin) 75 mg tablet   Hypertriglyceridemia   Overview    7/2024 = 226        Current Assessment & Plan    Start fish oil with nutritional changes and repeat in 3 months        Serum iron raised   Overview    ?  "OCP  Repeat levels in a couple weeks        Vitamin D deficiency   Overview    7/2024 = 23        Current Assessment & Plan    She's on high dose, then will start 2000 units every day with lab recheck            Review of Systems   All other systems reviewed and are negative.      BP Readings from Last 3 Encounters:   08/03/24 132/88   07/18/24 124/84   07/11/24 121/85      Wt Readings from Last 3 Encounters:   08/03/24 86.2 kg (190 lb)   07/18/24 85.2 kg (187 lb 12.8 oz)   07/11/24 85.2 kg (187 lb 12.8 oz)      BMI:   Estimated body mass index is 28.89 kg/m² as calculated from the following:    Height as of 8/3/24: 1.727 m (5' 8\").    Weight as of 8/3/24: 86.2 kg (190 lb).    Objective   Physical Exam  Gen: Alert, NAD  Respiratory:  resp effort NL, speaking in complete sentences   Neuro:  coordination intact   Mood: normal    Sitting upright    "

## 2024-08-22 DIAGNOSIS — F32.A ANXIETY AND DEPRESSION: ICD-10-CM

## 2024-08-22 DIAGNOSIS — F41.9 ANXIETY AND DEPRESSION: ICD-10-CM

## 2024-08-22 RX ORDER — BUPROPION HYDROCHLORIDE 150 MG/1
150 TABLET ORAL EVERY MORNING
Qty: 90 TABLET | Refills: 3 | Status: SHIPPED | OUTPATIENT
Start: 2024-08-22 | End: 2025-08-22

## 2024-08-28 ENCOUNTER — OFFICE VISIT (OUTPATIENT)
Dept: PRIMARY CARE | Facility: CLINIC | Age: 29
End: 2024-08-28
Payer: COMMERCIAL

## 2024-08-28 VITALS
WEIGHT: 194 LBS | DIASTOLIC BLOOD PRESSURE: 87 MMHG | BODY MASS INDEX: 29.5 KG/M2 | SYSTOLIC BLOOD PRESSURE: 123 MMHG | HEART RATE: 84 BPM | OXYGEN SATURATION: 98 % | TEMPERATURE: 98 F

## 2024-08-28 DIAGNOSIS — G43.831 INTRACTABLE MENSTRUAL MIGRAINE WITH STATUS MIGRAINOSUS: Primary | ICD-10-CM

## 2024-08-28 PROCEDURE — 99213 OFFICE O/P EST LOW 20 MIN: CPT | Performed by: NURSE PRACTITIONER

## 2024-08-28 PROCEDURE — 1036F TOBACCO NON-USER: CPT | Performed by: NURSE PRACTITIONER

## 2024-08-28 RX ORDER — IBUPROFEN 600 MG/1
600 TABLET ORAL ONCE
Status: COMPLETED | OUTPATIENT
Start: 2024-08-28 | End: 2024-08-28

## 2024-08-28 RX ORDER — ACETAMINOPHEN 325 MG/1
975 TABLET ORAL ONCE
Status: COMPLETED | OUTPATIENT
Start: 2024-08-28 | End: 2024-08-28

## 2024-08-28 ASSESSMENT — ENCOUNTER SYMPTOMS
MYALGIAS: 0
WEIGHT LOSS: 0
WHEEZING: 0
HEARTBURN: 0
FEVER: 0
SORE THROAT: 1
HEMOPTYSIS: 0
RHINORRHEA: 1
COUGH: 1
CHILLS: 0
SHORTNESS OF BREATH: 0
SWEATS: 0
HEADACHES: 1

## 2024-08-28 NOTE — PROGRESS NOTES
Subjective   Patient ID: Mary Jackson is a 29 y.o. female who presents for Migraine (HA x's 3 days/Runny and congested nose /Cough /).    HA started 2 days ago  Got worse Monday  Diarrhea, nausea, stomach pain  Next morning all resolved besides HA  Period also started that day  Yesterday, congestion  Covid test negative  No hx of seasonal allergies  No cough  No ear pain  No sore throat  Tylenol       Cough  This is a new problem. The current episode started in the past 7 days. The problem has been unchanged. The problem occurs every few hours. The cough is Non-productive. Associated symptoms include headaches, nasal congestion, postnasal drip, rhinorrhea and a sore throat. Pertinent negatives include no chest pain, chills, ear congestion, ear pain, fever, heartburn, hemoptysis, myalgias, rash, shortness of breath, sweats, weight loss or wheezing. Nothing aggravates the symptoms.       Review of Systems   Constitutional:  Negative for chills, fever and weight loss.   HENT:  Positive for postnasal drip, rhinorrhea and sore throat. Negative for ear pain.    Respiratory:  Positive for cough. Negative for hemoptysis, shortness of breath and wheezing.    Cardiovascular:  Negative for chest pain.   Gastrointestinal:  Negative for heartburn.   Musculoskeletal:  Negative for myalgias.   Skin:  Negative for rash.   Neurological:  Positive for headaches.     ROS completely negative except what was mentioned in the HPI.  Problem List, surgical, social, and family histories which were reviewed and updated as necessary within the EMR. I also personally reviewed the notes, labs, and imaging that pertained to what was documented or discussed in the HPI.    Objective   Physical Exam  Vitals and nursing note reviewed.   Constitutional:       General: She is not in acute distress.     Appearance: Normal appearance.   HENT:      Head: Normocephalic and atraumatic.      Right Ear: External ear normal.      Left Ear: External ear  normal.      Nose: Rhinorrhea present.      Mouth/Throat:      Mouth: Mucous membranes are moist.      Pharynx: Oropharynx is clear.   Eyes:      Extraocular Movements: Extraocular movements intact.      Conjunctiva/sclera: Conjunctivae normal.      Pupils: Pupils are equal, round, and reactive to light.   Cardiovascular:      Rate and Rhythm: Normal rate and regular rhythm.      Heart sounds: Normal heart sounds.   Pulmonary:      Effort: Pulmonary effort is normal.      Breath sounds: Normal breath sounds.   Musculoskeletal:         General: Normal range of motion.      Cervical back: Normal range of motion and neck supple.   Skin:     General: Skin is warm and dry.   Neurological:      General: No focal deficit present.      Mental Status: She is alert and oriented to person, place, and time. Mental status is at baseline.   Psychiatric:         Mood and Affect: Mood normal.         Behavior: Behavior normal.         Thought Content: Thought content normal.         Judgment: Judgment normal.         /87   Pulse 84   Temp 36.7 °C (98 °F) (Temporal)   Wt 88 kg (194 lb)   SpO2 98%   BMI 29.50 kg/m²     Assessment/Plan    Problem List Items Addressed This Visit    None  Visit Diagnoses       Intractable menstrual migraine with status migrainosus    -  Primary    tolerated ibuprofen in past.  Discussed risks and benefits. Treated with ibuprofen/tylenol combo today.    Relevant Medications    ibuprofen tablet 600 mg (Completed)    acetaminophen (Tylenol) tablet 975 mg (Completed)

## 2024-09-03 DIAGNOSIS — I49.1 PAC (PREMATURE ATRIAL CONTRACTION): ICD-10-CM

## 2024-09-03 DIAGNOSIS — I47.10 PAROXYSMAL SUPRAVENTRICULAR TACHYCARDIA (CMS-HCC): Primary | ICD-10-CM

## 2024-09-03 DIAGNOSIS — R00.2 PALPITATIONS: ICD-10-CM

## 2024-09-03 DIAGNOSIS — I47.11 INAPPROPRIATE SINUS TACHYCARDIA (CMS-HCC): ICD-10-CM

## 2024-09-03 NOTE — PROGRESS NOTES
Loop recorder was denied. Per Dr. Montiel, we will order two 2 week ziopatches (28 days total). Order placed and sent to physician for signature.

## 2024-09-04 ENCOUNTER — TELEPHONE (OUTPATIENT)
Dept: CARDIOLOGY | Facility: CLINIC | Age: 29
End: 2024-09-04
Payer: COMMERCIAL

## 2024-09-04 NOTE — TELEPHONE ENCOUNTER
14 day Giancarlo [id number] times 2 is approved per the [facility] Portal and is valid from 9/4/24-12/4/24-Ref#[id number]. Also spoke to Snow Bryan ref#[id number].

## 2024-09-05 NOTE — PROGRESS NOTES
"Annual Exam    Pap: 2023 nml HPV-  2022 LGSIL HPV+(other)  Colpo: 2022 ARELIS 1  Fei: Never  LMP: 2024  CC: None     Ruth Guevara MA II     GYN ANNUAL EXAM    SUBJECTIVE    Mary Jackson is a 29 y.o. female who presents for annual exam today.  Her periods are regular and monthly.  She is using OCPs for contraception and is happy with this.  She has no complaints today.      PMH - SVT, thyroid disease, severe dysplastic nevi on abdomen      PSH - wisdom teeth, hernia, tonsillectomy, cardiac ablation x 2     OB history -   OB History    Para Term  AB Living   1 1 1 0 0 1   SAB IAB Ectopic Multiple Live Births   0 0 0 0 1      # Outcome Date GA Lbr Tristan/2nd Weight Sex Type Anes PTL Lv   1 Term              Last pap -   Normal HPV Negative  - ARELIS I in      Last mammogram - not yet indicated     Family history of breast or ovarian cancer - none     OBJECTIVE  BP (!) 134/98 (BP Location: Right arm, Patient Position: Sitting)   Pulse 108   Ht 1.727 m (5' 8\")   Wt 87.1 kg (192 lb)   LMP 2024 (Exact Date)   SpO2 98%   Breastfeeding No   BMI 29.19 kg/m²     General Appearance   - consistent with stated age, well groomed and cooperative    Integumentary  - skin warm and dry without rash    Head and Neck  - normalocephalic and neck supple    Chest and Lung Exam  - normal breathing effort, no respiratory distress    Breast  - symmetry noted, no mass palpable, no skin change and no nipple discharge.    Abdomen  - soft, nontender and no hepatomegaly, splenomegaly, or mass    Female Genitourinary  - vulva normal without rash or lesion, normal vaginal rugae, no vaginal discharge, uterus normal size & no palpable masses, no adnexal mass, no adnexal tenderness, no cervical motion tenderness    Peripheral Vascular  - no edema present    ASSESSMENT/PLAN  29 y.o.  female who presents for annual exam.       Actions performed during this visit include:  - Clinical breast exam  - " Clinical pelvic exam  - Pap- h/o ARELIS I.  Pap last year WNL.  Repeat pap done today.  If WNL, will space out to 3 hours.   - Mammogram- not yet indicated   - Colon cancer screening- not yet indicated   - Contraception - happy with OCPs.  Prescription sent.   - STI screening  - Declines.     Please return for your next visit in 1 year or sooner as needed.     Nathaly Minor MD

## 2024-09-06 ENCOUNTER — APPOINTMENT (OUTPATIENT)
Dept: OBSTETRICS AND GYNECOLOGY | Facility: CLINIC | Age: 29
End: 2024-09-06
Payer: COMMERCIAL

## 2024-09-06 VITALS
WEIGHT: 192 LBS | HEIGHT: 68 IN | DIASTOLIC BLOOD PRESSURE: 98 MMHG | BODY MASS INDEX: 29.1 KG/M2 | OXYGEN SATURATION: 98 % | HEART RATE: 108 BPM | SYSTOLIC BLOOD PRESSURE: 134 MMHG

## 2024-09-06 DIAGNOSIS — N92.6 IRREGULAR PERIODS/MENSTRUAL CYCLES: ICD-10-CM

## 2024-09-06 DIAGNOSIS — Z01.419 WELL WOMAN EXAM WITH ROUTINE GYNECOLOGICAL EXAM: Primary | ICD-10-CM

## 2024-09-06 PROCEDURE — 99395 PREV VISIT EST AGE 18-39: CPT | Performed by: OBSTETRICS & GYNECOLOGY

## 2024-09-06 PROCEDURE — 3008F BODY MASS INDEX DOCD: CPT | Performed by: OBSTETRICS & GYNECOLOGY

## 2024-09-06 PROCEDURE — 1036F TOBACCO NON-USER: CPT | Performed by: OBSTETRICS & GYNECOLOGY

## 2024-09-06 PROCEDURE — 87624 HPV HI-RISK TYP POOLED RSLT: CPT

## 2024-09-06 RX ORDER — NORETHINDRONE ACETATE AND ETHINYL ESTRADIOL 1.5-30(21)
1 KIT ORAL DAILY
Qty: 28 TABLET | Refills: 11 | Status: SHIPPED | OUTPATIENT
Start: 2024-09-06

## 2024-09-06 SDOH — ECONOMIC STABILITY: TRANSPORTATION INSECURITY
IN THE PAST 12 MONTHS, HAS LACK OF TRANSPORTATION KEPT YOU FROM MEETINGS, WORK, OR FROM GETTING THINGS NEEDED FOR DAILY LIVING?: NO

## 2024-09-06 SDOH — HEALTH STABILITY: PHYSICAL HEALTH: ON AVERAGE, HOW MANY DAYS PER WEEK DO YOU ENGAGE IN MODERATE TO STRENUOUS EXERCISE (LIKE A BRISK WALK)?: 0 DAYS

## 2024-09-06 SDOH — ECONOMIC STABILITY: FOOD INSECURITY: WITHIN THE PAST 12 MONTHS, THE FOOD YOU BOUGHT JUST DIDN'T LAST AND YOU DIDN'T HAVE MONEY TO GET MORE.: NEVER TRUE

## 2024-09-06 SDOH — ECONOMIC STABILITY: FOOD INSECURITY: WITHIN THE PAST 12 MONTHS, YOU WORRIED THAT YOUR FOOD WOULD RUN OUT BEFORE YOU GOT MONEY TO BUY MORE.: NEVER TRUE

## 2024-09-06 SDOH — ECONOMIC STABILITY: TRANSPORTATION INSECURITY
IN THE PAST 12 MONTHS, HAS THE LACK OF TRANSPORTATION KEPT YOU FROM MEDICAL APPOINTMENTS OR FROM GETTING MEDICATIONS?: NO

## 2024-09-06 SDOH — HEALTH STABILITY: PHYSICAL HEALTH: ON AVERAGE, HOW MANY MINUTES DO YOU ENGAGE IN EXERCISE AT THIS LEVEL?: 0 MIN

## 2024-09-06 ASSESSMENT — ENCOUNTER SYMPTOMS
NECK PAIN: 0
HYPERTENSION: 1
PALPITATIONS: 0
SHORTNESS OF BREATH: 0
HEADACHES: 0
ORTHOPNEA: 0
SWEATS: 0
PND: 0
BLURRED VISION: 0

## 2024-09-06 ASSESSMENT — PAIN SCALES - GENERAL: PAINLEVEL: 0-NO PAIN

## 2024-09-06 ASSESSMENT — PATIENT HEALTH QUESTIONNAIRE - PHQ9
2. FEELING DOWN, DEPRESSED OR HOPELESS: NOT AT ALL
1. LITTLE INTEREST OR PLEASURE IN DOING THINGS: NOT AT ALL
SUM OF ALL RESPONSES TO PHQ9 QUESTIONS 1 & 2: 0

## 2024-09-06 ASSESSMENT — SOCIAL DETERMINANTS OF HEALTH (SDOH): HOW HARD IS IT FOR YOU TO PAY FOR THE VERY BASICS LIKE FOOD, HOUSING, MEDICAL CARE, AND HEATING?: NOT HARD AT ALL

## 2024-09-06 ASSESSMENT — LIFESTYLE VARIABLES
HOW OFTEN DO YOU HAVE A DRINK CONTAINING ALCOHOL: 2-4 TIMES A MONTH
HOW OFTEN DO YOU HAVE SIX OR MORE DRINKS ON ONE OCCASION: NEVER
HOW MANY STANDARD DRINKS CONTAINING ALCOHOL DO YOU HAVE ON A TYPICAL DAY: 3 OR 4
SKIP TO QUESTIONS 9-10: 0
AUDIT-C TOTAL SCORE: 3

## 2024-09-11 NOTE — TELEPHONE ENCOUNTER
Clarification.  No office visit occurred.  Patient was telephoned by office to schedule monitor.    Mireille Montiel MD

## 2024-09-17 ENCOUNTER — APPOINTMENT (OUTPATIENT)
Dept: CARDIOLOGY | Facility: CLINIC | Age: 29
End: 2024-09-17
Payer: COMMERCIAL

## 2024-09-19 LAB
CYTOLOGY CMNT CVX/VAG CYTO-IMP: NORMAL
HPV HR 12 DNA GENITAL QL NAA+PROBE: NEGATIVE
HPV HR GENOTYPES PNL CVX NAA+PROBE: NEGATIVE
HPV16 DNA SPEC QL NAA+PROBE: NEGATIVE
HPV18 DNA SPEC QL NAA+PROBE: NEGATIVE
LAB AP HPV GENOTYPE QUESTION: YES
LAB AP HPV HR: NORMAL
LABORATORY COMMENT REPORT: NORMAL
PATH REPORT.TOTAL CANCER: NORMAL

## 2024-09-20 ENCOUNTER — APPOINTMENT (OUTPATIENT)
Dept: CARDIOLOGY | Facility: CLINIC | Age: 29
End: 2024-09-20
Payer: COMMERCIAL

## 2024-09-26 ENCOUNTER — LAB (OUTPATIENT)
Dept: LAB | Facility: LAB | Age: 29
End: 2024-09-26
Payer: COMMERCIAL

## 2024-09-26 ENCOUNTER — OFFICE VISIT (OUTPATIENT)
Dept: PRIMARY CARE | Facility: CLINIC | Age: 29
End: 2024-09-26
Payer: COMMERCIAL

## 2024-09-26 VITALS
TEMPERATURE: 97.8 F | HEART RATE: 87 BPM | SYSTOLIC BLOOD PRESSURE: 120 MMHG | DIASTOLIC BLOOD PRESSURE: 80 MMHG | OXYGEN SATURATION: 96 %

## 2024-09-26 DIAGNOSIS — E55.9 VITAMIN D DEFICIENCY: ICD-10-CM

## 2024-09-26 DIAGNOSIS — G44.52 NEW DAILY PERSISTENT HEADACHE: Primary | ICD-10-CM

## 2024-09-26 DIAGNOSIS — R79.0 SERUM IRON RAISED: ICD-10-CM

## 2024-09-26 DIAGNOSIS — G44.52 NEW DAILY PERSISTENT HEADACHE: ICD-10-CM

## 2024-09-26 DIAGNOSIS — E78.1 HYPERTRIGLYCERIDEMIA: ICD-10-CM

## 2024-09-26 LAB
25(OH)D3 SERPL-MCNC: 31 NG/ML (ref 30–100)
ANION GAP SERPL CALC-SCNC: 13 MMOL/L (ref 10–20)
BASOPHILS # BLD AUTO: 0.06 X10*3/UL (ref 0–0.1)
BASOPHILS NFR BLD AUTO: 1 %
BUN SERPL-MCNC: 9 MG/DL (ref 6–23)
CALCIUM SERPL-MCNC: 9.2 MG/DL (ref 8.6–10.3)
CHLORIDE SERPL-SCNC: 103 MMOL/L (ref 98–107)
CHOLEST SERPL-MCNC: 200 MG/DL (ref 0–199)
CHOLESTEROL/HDL RATIO: 3.8
CO2 SERPL-SCNC: 25 MMOL/L (ref 21–32)
CREAT SERPL-MCNC: 0.85 MG/DL (ref 0.5–1.05)
CRP SERPL-MCNC: 0.58 MG/DL
EGFRCR SERPLBLD CKD-EPI 2021: >90 ML/MIN/1.73M*2
EOSINOPHIL # BLD AUTO: 0.24 X10*3/UL (ref 0–0.7)
EOSINOPHIL NFR BLD AUTO: 3.9 %
ERYTHROCYTE [DISTWIDTH] IN BLOOD BY AUTOMATED COUNT: 12.1 % (ref 11.5–14.5)
ERYTHROCYTE [SEDIMENTATION RATE] IN BLOOD BY WESTERGREN METHOD: 7 MM/H (ref 0–20)
GLUCOSE SERPL-MCNC: 79 MG/DL (ref 74–99)
HCT VFR BLD AUTO: 40.5 % (ref 36–46)
HDLC SERPL-MCNC: 52.8 MG/DL
HGB BLD-MCNC: 13.3 G/DL (ref 12–16)
IMM GRANULOCYTES # BLD AUTO: 0.01 X10*3/UL (ref 0–0.7)
IMM GRANULOCYTES NFR BLD AUTO: 0.2 % (ref 0–0.9)
IRON SATN MFR SERPL: 21 % (ref 25–45)
IRON SERPL-MCNC: 118 UG/DL (ref 35–150)
LDLC SERPL CALC-MCNC: 107 MG/DL
LYMPHOCYTES # BLD AUTO: 1.48 X10*3/UL (ref 1.2–4.8)
LYMPHOCYTES NFR BLD AUTO: 24.3 %
MAGNESIUM SERPL-MCNC: 2.02 MG/DL (ref 1.6–2.4)
MCH RBC QN AUTO: 28.7 PG (ref 26–34)
MCHC RBC AUTO-ENTMCNC: 32.8 G/DL (ref 32–36)
MCV RBC AUTO: 88 FL (ref 80–100)
MONOCYTES # BLD AUTO: 0.36 X10*3/UL (ref 0.1–1)
MONOCYTES NFR BLD AUTO: 5.9 %
NEUTROPHILS # BLD AUTO: 3.93 X10*3/UL (ref 1.2–7.7)
NEUTROPHILS NFR BLD AUTO: 64.7 %
NON HDL CHOLESTEROL: 147 MG/DL (ref 0–149)
NRBC BLD-RTO: 0 /100 WBCS (ref 0–0)
PLATELET # BLD AUTO: 286 X10*3/UL (ref 150–450)
POTASSIUM SERPL-SCNC: 4.5 MMOL/L (ref 3.5–5.3)
RBC # BLD AUTO: 4.63 X10*6/UL (ref 4–5.2)
SODIUM SERPL-SCNC: 136 MMOL/L (ref 136–145)
TIBC SERPL-MCNC: 554 UG/DL (ref 240–445)
TRIGL SERPL-MCNC: 202 MG/DL (ref 0–149)
TSH SERPL-ACNC: 1.2 MIU/L (ref 0.44–3.98)
UIBC SERPL-MCNC: 436 UG/DL (ref 110–370)
VLDL: 40 MG/DL (ref 0–40)
WBC # BLD AUTO: 6.1 X10*3/UL (ref 4.4–11.3)

## 2024-09-26 PROCEDURE — 99214 OFFICE O/P EST MOD 30 MIN: CPT | Performed by: NURSE PRACTITIONER

## 2024-09-26 PROCEDURE — 84443 ASSAY THYROID STIM HORMONE: CPT

## 2024-09-26 PROCEDURE — 83735 ASSAY OF MAGNESIUM: CPT

## 2024-09-26 PROCEDURE — 86140 C-REACTIVE PROTEIN: CPT

## 2024-09-26 PROCEDURE — 80048 BASIC METABOLIC PNL TOTAL CA: CPT

## 2024-09-26 PROCEDURE — 82306 VITAMIN D 25 HYDROXY: CPT

## 2024-09-26 PROCEDURE — 83540 ASSAY OF IRON: CPT

## 2024-09-26 PROCEDURE — 1036F TOBACCO NON-USER: CPT | Performed by: NURSE PRACTITIONER

## 2024-09-26 PROCEDURE — 85652 RBC SED RATE AUTOMATED: CPT

## 2024-09-26 PROCEDURE — 85025 COMPLETE CBC W/AUTO DIFF WBC: CPT

## 2024-09-26 PROCEDURE — 80061 LIPID PANEL: CPT

## 2024-09-26 PROCEDURE — 36415 COLL VENOUS BLD VENIPUNCTURE: CPT

## 2024-09-26 PROCEDURE — 83550 IRON BINDING TEST: CPT

## 2024-09-26 RX ORDER — METHYLPREDNISOLONE 4 MG/1
TABLET ORAL
Qty: 21 TABLET | Refills: 0 | Status: SHIPPED | OUTPATIENT
Start: 2024-09-26 | End: 2024-10-03

## 2024-09-26 NOTE — PROGRESS NOTES
Problem List Items Addressed This Visit       Headache - Primary    Overview     7/26/23 CT head:  No CT evidence for acute intracranial abnormality. If symptoms   persist, further evaluation with MRI can be performed as clinically   warranted for better assessment.          Current Assessment & Plan     ? Etiology  Mom and sister with migraine  Get MRI given chronicity of episodes  Check labs including inflammation markers  Try medrol pack             Relevant Medications    methylPREDNISolone (Medrol Dospak) 4 mg tablets    Other Relevant Orders    MR brain w and wo IV contrast    CBC and Auto Differential    Basic metabolic panel    Magnesium    C-reactive protein    Sedimentation Rate    TSH with reflex to Free T4 if abnormal        Subjective   Patient ID: Mary Jackson is a 29 y.o. female who presents for Headache (Off and on for a few weeks /Go to bed with it and wake up with it /Tylenol not helping ).  HPI  Subjective   Sx began about a month ago  Temples bilat  Wears contacts & glasses  - consistently uses  Abrupt onset  - was at work  Not much radiation  Sharp pain comes & goes  Does have HA right now  Can last up to 4 hours  - fade away  Exacerbated by:   Relieved by:   Tylenol  Has are staying the same   Not interfering with daily activity  Not associated with period  LMP this week 9/23/24  - regular cycles  No nasal congestion  No fever, cough, sore throat   Nausea x 1 episode  No accompanying sx  Mom and sister with migraine hx    Component      Latest Ref Rng 7/11/2024 8/3/2024   WBC      4.4 - 11.3 x10*3/uL  6.0    nRBC      0.0 - 0.0 /100 WBCs  0.0    RBC      4.00 - 5.20 x10*6/uL  4.73    HEMOGLOBIN      12.0 - 16.0 g/dL  13.0    HEMATOCRIT      36.0 - 46.0 %  40.5    MCV      80 - 100 fL  86    MCH      26.0 - 34.0 pg  27.5    MCHC      32.0 - 36.0 g/dL  32.1    RED CELL DISTRIBUTION WIDTH      11.5 - 14.5 %  12.3    Platelets      150 - 450 x10*3/uL  286    Neutrophils %      40.0 - 80.0 %   47.5    Immature Granulocytes %, Automated      0.0 - 0.9 %  0.2    Lymphocytes %      13.0 - 44.0 %  41.4    Monocytes %      2.0 - 10.0 %  7.2    Eosinophils %      0.0 - 6.0 %  3.2    Basophils %      0.0 - 2.0 %  0.5    Neutrophils Absolute      1.20 - 7.70 x10*3/uL  2.83    Immature Granulocytes Absolute, Automated      0.00 - 0.70 x10*3/uL  0.01    Lymphocytes Absolute      1.20 - 4.80 x10*3/uL  2.47    Monocytes Absolute      0.10 - 1.00 x10*3/uL  0.43    Eosinophils Absolute      0.00 - 0.70 x10*3/uL  0.19    Basophils Absolute      0.00 - 0.10 x10*3/uL  0.03    GLUCOSE      74 - 99 mg/dL 74  111 (H)    SODIUM      136 - 145 mmol/L 137  141    POTASSIUM      3.5 - 5.3 mmol/L 4.3  4.0    CHLORIDE      98 - 107 mmol/L 103  104    Bicarbonate      21 - 32 mmol/L 25  23    Anion Gap      10 - 20 mmol/L 13  18    Blood Urea Nitrogen      6 - 23 mg/dL 10  11    Creatinine      0.50 - 1.05 mg/dL 0.75  0.78    EGFR      >60 mL/min/1.73m*2 >90  >90    Calcium      8.6 - 10.6 mg/dL 9.0  9.4    Albumin      3.4 - 5.0 g/dL  4.3    Alkaline Phosphatase      33 - 110 U/L  58    Total Protein      6.4 - 8.2 g/dL  7.1    AST      9 - 39 U/L  28    Bilirubin Total      0.0 - 1.2 mg/dL  0.3    ALT      7 - 45 U/L  29    CHOLESTEROL      0 - 199 mg/dL 189     HDL CHOLESTEROL      mg/dL 56.7     Cholesterol/HDL Ratio 3.3     LDL Calculated      <=99 mg/dL 87     VLDL      0 - 40 mg/dL 45 (H)     TRIGLYCERIDES      0 - 149 mg/dL 226 (H)     Non HDL Cholesterol      0 - 149 mg/dL 132     IRON      35 - 150 ug/dL 167 (H)     UIBC      110 - 370 ug/dL 373 (H)     TIBC      240 - 445 ug/dL 540 (H)     % Saturation      25 - 45 % 31     Vitamin D, 25-Hydroxy, Total      30 - 100 ng/mL 23 (L)     FERRITIN      8 - 150 ng/mL 28     MAGNESIUM      1.60 - 2.40 mg/dL  2.00       [facility] 8/28/24 note:  Intractable menstrual migraine with status migrainosus    -  Primary      tolerated ibuprofen in past.  Discussed risks and benefits.  Treated with ibuprofen/tylenol combo today.     Relevant Medications     ibuprofen tablet 600 mg (Completed)     acetaminophen (Tylenol) tablet 975 mg (Completed)     23 CT head:  No CT evidence for acute intracranial abnormality. If symptoms   persist, further evaluation with MRI can be performed as clinically   warranted for better assessment.     23 ED copied:  MEDICAL DECISION MAKIN-year-old female with a past medical history of SVT s/p cardiac ablation,   Graves' disease presenting to the ED for sudden onset headache and transient   vision changes.  Patient is well-appearing on exam and hemodynamically stable.   She is neurologically intact.  She reports a progressively worsening headache   without thunderclap onset.  Do not suspect cerebral aneurysmal rupture however   due to new onset headaches and concurrent vision changes, CT head was obtained   to rule out acute intracranial bleeding versus mass.       Results were reviewed and there is no evidence of acute findings on head CT.   Do not suspect meningismus based on physical exam and history.  Labs were   obtained with normal TSH.  No anemia or significant electrolyte abnormalities.   hCG negative.       Chart review shows that patient had a duplex ultrasound of the right lower   extremity due to feeling a lump in her right groin after her ablation   procedure.  This shows a residual hematoma in the right groin.  Patient will   continue to apply heat to this area but there is no indication for acute   intervention.  No DVT present.       Bedside ocular ultrasound was performed without evidence of retinal detachment   or vitreous hemorrhage.  Visual acuity shows left eye 20/25, right eye 20/20   which is her baseline with contacts.       On reevaluation after treatment with IV fluids, Reglan and Benadryl her   headache has improved.  Patient will follow-up with PCP and ophthalmology for   further management and she is comfortable with this  "plan.  She will return to   the ED for worsening headache, vision changes, vomiting, focal deficits.    Review of Systems   All other systems reviewed and are negative.      BP Readings from Last 3 Encounters:   09/26/24 120/80   09/06/24 (!) 134/98   08/28/24 123/87      Wt Readings from Last 3 Encounters:   09/06/24 87.1 kg (192 lb)   08/28/24 88 kg (194 lb)   08/03/24 86.2 kg (190 lb)      BMI:   Estimated body mass index is 29.19 kg/m² as calculated from the following:    Height as of 9/6/24: 1.727 m (5' 8\").    Weight as of 9/6/24: 87.1 kg (192 lb).    Objective   Physical Exam  Constitutional:       General: She is not in acute distress.  HENT:      Head: Normocephalic and atraumatic.      Right Ear: Tympanic membrane and external ear normal.      Left Ear: Tympanic membrane and external ear normal.      Nose: Nose normal.      Mouth/Throat:      Mouth: Mucous membranes are moist.   Eyes:      Extraocular Movements: Extraocular movements intact.      Conjunctiva/sclera: Conjunctivae normal.   Cardiovascular:      Rate and Rhythm: Normal rate and regular rhythm.      Pulses: Normal pulses.   Pulmonary:      Effort: Pulmonary effort is normal.      Breath sounds: Normal breath sounds.   Musculoskeletal:         General: Normal range of motion.      Cervical back: Normal range of motion and neck supple. No rigidity or tenderness.   Lymphadenopathy:      Cervical: No cervical adenopathy.   Skin:     General: Skin is warm and dry.   Neurological:      General: No focal deficit present.      Mental Status: She is alert and oriented to person, place, and time.      Cranial Nerves: No cranial nerve deficit.      Sensory: No sensory deficit.      Motor: No weakness.      Coordination: Coordination normal.      Gait: Gait normal.      Deep Tendon Reflexes: Reflexes normal.   Psychiatric:         Mood and Affect: Mood normal.           "

## 2024-09-26 NOTE — ASSESSMENT & PLAN NOTE
? Etiology  Mom and sister with migraine  Get MRI given chronicity of episodes  Check labs including inflammation markers  Try medrol pack

## 2024-10-07 DIAGNOSIS — F32.A ANXIETY AND DEPRESSION: ICD-10-CM

## 2024-10-07 DIAGNOSIS — F41.9 ANXIETY AND DEPRESSION: ICD-10-CM

## 2024-10-07 RX ORDER — BUPROPION HYDROCHLORIDE 300 MG/1
300 TABLET ORAL EVERY MORNING
Qty: 90 TABLET | Refills: 3 | Status: SHIPPED | OUTPATIENT
Start: 2024-10-07

## 2024-10-09 ENCOUNTER — HOSPITAL ENCOUNTER (OUTPATIENT)
Dept: RADIOLOGY | Facility: HOSPITAL | Age: 29
Discharge: HOME | End: 2024-10-09
Payer: COMMERCIAL

## 2024-10-09 DIAGNOSIS — G44.52 NEW DAILY PERSISTENT HEADACHE: ICD-10-CM

## 2024-10-09 PROCEDURE — 70553 MRI BRAIN STEM W/O & W/DYE: CPT

## 2024-10-09 PROCEDURE — 70553 MRI BRAIN STEM W/O & W/DYE: CPT | Performed by: RADIOLOGY

## 2024-10-09 PROCEDURE — 2550000001 HC RX 255 CONTRASTS: Performed by: NURSE PRACTITIONER

## 2024-10-09 PROCEDURE — A9575 INJ GADOTERATE MEGLUMI 0.1ML: HCPCS | Performed by: NURSE PRACTITIONER

## 2024-10-09 RX ORDER — GADOTERATE MEGLUMINE 376.9 MG/ML
17 INJECTION INTRAVENOUS
Status: COMPLETED | OUTPATIENT
Start: 2024-10-09 | End: 2024-10-09

## 2024-10-10 ENCOUNTER — APPOINTMENT (OUTPATIENT)
Dept: CARDIOLOGY | Facility: CLINIC | Age: 29
End: 2024-10-10
Payer: COMMERCIAL

## 2024-10-10 ENCOUNTER — HOSPITAL ENCOUNTER (OUTPATIENT)
Dept: RADIOLOGY | Facility: CLINIC | Age: 29
End: 2024-10-10
Payer: COMMERCIAL

## 2024-10-10 DIAGNOSIS — R00.2 PALPITATIONS: ICD-10-CM

## 2024-10-10 DIAGNOSIS — I47.10 PAROXYSMAL SUPRAVENTRICULAR TACHYCARDIA (CMS-HCC): ICD-10-CM

## 2024-10-10 DIAGNOSIS — I47.11 INAPPROPRIATE SINUS TACHYCARDIA (CMS-HCC): ICD-10-CM

## 2024-10-10 DIAGNOSIS — I49.1 PAC (PREMATURE ATRIAL CONTRACTION): ICD-10-CM

## 2024-10-10 PROCEDURE — 93248 EXT ECG>7D<15D REV&INTERPJ: CPT | Performed by: INTERNAL MEDICINE

## 2024-10-10 PROCEDURE — 93246 EXT ECG>7D<15D RECORDING: CPT | Performed by: INTERNAL MEDICINE

## 2024-10-16 ENCOUNTER — PATIENT MESSAGE (OUTPATIENT)
Dept: CARDIOLOGY | Facility: CLINIC | Age: 29
End: 2024-10-16
Payer: COMMERCIAL

## 2024-10-17 ENCOUNTER — DOCUMENTATION (OUTPATIENT)
Dept: CARDIOLOGY | Facility: CLINIC | Age: 29
End: 2024-10-17
Payer: COMMERCIAL

## 2024-10-17 NOTE — PROGRESS NOTES
Ticket was placed to the Help Desk to remove the incorrect report (ziopatch with dos 10/15/24) in the patient's chart.  Dr. Montiel is aware.

## 2024-10-24 ENCOUNTER — APPOINTMENT (OUTPATIENT)
Dept: CARDIOLOGY | Facility: CLINIC | Age: 29
End: 2024-10-24
Payer: COMMERCIAL

## 2024-10-24 DIAGNOSIS — I47.10 PAROXYSMAL SUPRAVENTRICULAR TACHYCARDIA (CMS-HCC): ICD-10-CM

## 2024-10-24 DIAGNOSIS — I49.1 PAC (PREMATURE ATRIAL CONTRACTION): ICD-10-CM

## 2024-10-24 DIAGNOSIS — I47.11 INAPPROPRIATE SINUS TACHYCARDIA (CMS-HCC): ICD-10-CM

## 2024-10-24 DIAGNOSIS — R00.2 PALPITATIONS: ICD-10-CM

## 2024-10-24 PROCEDURE — 93248 EXT ECG>7D<15D REV&INTERPJ: CPT | Performed by: INTERNAL MEDICINE

## 2024-10-24 PROCEDURE — 93246 EXT ECG>7D<15D RECORDING: CPT | Performed by: INTERNAL MEDICINE

## 2024-10-29 ENCOUNTER — DOCUMENTATION (OUTPATIENT)
Dept: CARDIOLOGY | Facility: CLINIC | Age: 29
End: 2024-10-29
Payer: COMMERCIAL

## 2024-11-19 ENCOUNTER — TELEPHONE (OUTPATIENT)
Dept: CARDIOLOGY | Facility: CLINIC | Age: 29
End: 2024-11-19
Payer: COMMERCIAL

## 2024-11-19 NOTE — TELEPHONE ENCOUNTER
----- Message from Mireille Montiel sent at 11/18/2024  9:13 AM EST -----  Call pt  One brief episode of tachycardia less than 6 seconds duration    Check on any current symptoms  Continue med  Consider EP possible ablation however noting that last study had no inducible arrhythmia  ----- Message -----  From: Mireille Montiel MD  Sent: 11/18/2024   9:12 AM EST  To: Mireille Montiel MD

## 2024-11-19 NOTE — TELEPHONE ENCOUNTER
Called patient with the results of her recent cardiac monitor. Patient stated she has no current symptoms and would like to think about the possibility of EP study with possible ablation before scheduling it. Patient will call back if she would like to schedule.

## 2025-03-18 ENCOUNTER — PATIENT MESSAGE (OUTPATIENT)
Dept: CARDIOLOGY | Facility: CLINIC | Age: 30
End: 2025-03-18
Payer: COMMERCIAL

## 2025-03-18 DIAGNOSIS — I47.10 PAROXYSMAL SVT (SUPRAVENTRICULAR TACHYCARDIA) (CMS-HCC): ICD-10-CM

## 2025-03-18 DIAGNOSIS — I47.11 INAPPROPRIATE SINUS TACHYCARDIA (CMS-HCC): ICD-10-CM

## 2025-03-18 DIAGNOSIS — R00.2 PALPITATIONS: ICD-10-CM

## 2025-03-19 RX ORDER — DILTIAZEM HYDROCHLORIDE 30 MG/1
30 TABLET, FILM COATED ORAL 2 TIMES DAILY PRN
Qty: 180 TABLET | Refills: 3 | Status: SHIPPED | OUTPATIENT
Start: 2025-03-19 | End: 2026-03-19

## 2025-03-19 RX ORDER — DILTIAZEM HYDROCHLORIDE 300 MG/1
300 CAPSULE, COATED, EXTENDED RELEASE ORAL DAILY
Qty: 90 CAPSULE | Refills: 3 | Status: SHIPPED | OUTPATIENT
Start: 2025-03-19 | End: 2026-03-19

## 2025-03-20 ENCOUNTER — TELEPHONE (OUTPATIENT)
Dept: CARDIOLOGY | Facility: CLINIC | Age: 30
End: 2025-03-20
Payer: COMMERCIAL

## 2025-04-02 ENCOUNTER — TELEPHONE (OUTPATIENT)
Dept: PRIMARY CARE | Facility: CLINIC | Age: 30
End: 2025-04-02

## 2025-04-02 ENCOUNTER — TELEMEDICINE (OUTPATIENT)
Dept: PRIMARY CARE | Facility: CLINIC | Age: 30
End: 2025-04-02
Payer: COMMERCIAL

## 2025-04-02 DIAGNOSIS — F41.9 ANXIETY AND DEPRESSION: Primary | ICD-10-CM

## 2025-04-02 DIAGNOSIS — F32.A ANXIETY AND DEPRESSION: Primary | ICD-10-CM

## 2025-04-02 PROCEDURE — 1036F TOBACCO NON-USER: CPT | Performed by: NURSE PRACTITIONER

## 2025-04-02 PROCEDURE — 99213 OFFICE O/P EST LOW 20 MIN: CPT | Performed by: NURSE PRACTITIONER

## 2025-04-02 RX ORDER — BUPROPION HYDROCHLORIDE 150 MG/1
150 TABLET ORAL EVERY MORNING
Qty: 90 TABLET | Refills: 3 | Status: SHIPPED | OUTPATIENT
Start: 2025-04-02 | End: 2026-04-02

## 2025-04-02 ASSESSMENT — PATIENT HEALTH QUESTIONNAIRE - PHQ9
SUM OF ALL RESPONSES TO PHQ9 QUESTIONS 1 AND 2: 1
1. LITTLE INTEREST OR PLEASURE IN DOING THINGS: NOT AT ALL
10. IF YOU CHECKED OFF ANY PROBLEMS, HOW DIFFICULT HAVE THESE PROBLEMS MADE IT FOR YOU TO DO YOUR WORK, TAKE CARE OF THINGS AT HOME, OR GET ALONG WITH OTHER PEOPLE: SOMEWHAT DIFFICULT
2. FEELING DOWN, DEPRESSED OR HOPELESS: SEVERAL DAYS

## 2025-04-02 NOTE — PROGRESS NOTES
An interactive audio/visual  telecommunication system which permits real time communications between the patient (at the originating site) and provider (at the distant site) was utilized to provide this telehealth service.    Verbal consent was requested and obtained from the patient for this telehealth visit.  We have confirmed the patient wishes to see me, Skye Boyer, a board certified nurse practitioner with an active [place] advanced practice license as well as verified the patient's identity and physical location in [place].    Problem List Items Addressed This Visit          Medium    Anxiety and depression - Primary    Overview     S/p zoloft and lexapro wo relief of sx   7/2024 Gene Sight: prozac & paxil significant G-D interaction. Celexa, lexapro & zoloft moderate G-D interaction (moderately reduced efficacy). No G-D interaction: elavil, wellbutrin, anafranil, norpramin, pristiq, sinequan, cymbalta, luvox, tofranil, fetzima, remeron, pamelor, emsam, desyrel, effexor (risk QT prolongation), vibryd, trintellix  7/18/24: begin wean lexapro with low dose wellbutrin. Ok to titrate +/-. Fu 30 days, prn. Continue counseling  8/15/24: increase wellbutrin to 75 mg bid. Ok to cont titration   8/22/24: change to wellbutrin  mg every day  4/2/25: increase wellbutrin XL to 450 mg every day. Cont counseling          Relevant Medications    buPROPion XL (Wellbutrin XL) 150 mg 24 hr tablet        Subjective   Patient ID: Mary Jackson is a 29 y.o. female who presents for increase medication (Increase wellbutrin currently taking 300 mg once daily).  HPI  Anxiety = depression  Counselor   Work increased stressors    Review of Systems   All other systems reviewed and are negative.      BP Readings from Last 3 Encounters:   09/26/24 120/80   09/06/24 (!) 134/98   08/28/24 123/87      Wt Readings from Last 3 Encounters:   10/09/24 86.2 kg (190 lb)   09/06/24 87.1 kg (192 lb)   08/28/24 88 kg (194 lb)      BMI:   Estimated  "body mass index is 28.89 kg/m² as calculated from the following:    Height as of 10/9/24: 1.727 m (5' 8\").    Weight as of 10/9/24: 86.2 kg (190 lb).    Objective   Physical Exam  Gen: Alert, NAD  Respiratory:  resp effort NL, speaking in complete sentences   Neuro:  coordination intact   Mood: normal    Sitting upright     "

## 2025-04-04 ENCOUNTER — OFFICE VISIT (OUTPATIENT)
Dept: OBSTETRICS AND GYNECOLOGY | Facility: CLINIC | Age: 30
End: 2025-04-04
Payer: COMMERCIAL

## 2025-04-04 VITALS
WEIGHT: 189 LBS | HEIGHT: 68 IN | DIASTOLIC BLOOD PRESSURE: 90 MMHG | BODY MASS INDEX: 28.64 KG/M2 | SYSTOLIC BLOOD PRESSURE: 141 MMHG | HEART RATE: 95 BPM

## 2025-04-04 DIAGNOSIS — N89.8 VAGINAL DISCHARGE: Primary | ICD-10-CM

## 2025-04-04 DIAGNOSIS — N89.8 VAGINAL ODOR: ICD-10-CM

## 2025-04-04 PROCEDURE — 3008F BODY MASS INDEX DOCD: CPT | Performed by: OBSTETRICS & GYNECOLOGY

## 2025-04-04 PROCEDURE — 99213 OFFICE O/P EST LOW 20 MIN: CPT | Performed by: OBSTETRICS & GYNECOLOGY

## 2025-04-04 PROCEDURE — 1036F TOBACCO NON-USER: CPT | Performed by: OBSTETRICS & GYNECOLOGY

## 2025-04-04 ASSESSMENT — PAIN SCALES - GENERAL: PAINLEVEL_OUTOF10: 0-NO PAIN

## 2025-04-04 NOTE — PROGRESS NOTES
"Patient presents for vaginal concerns   LMP: 3/10/2025  C/O: vaginal odor and discharge. STD screening    Ruth Guevara MA II    GYN visit - vaginal discharge     HPI: Pt presents for visit due to new-onset watery, clear vaginal discharge.  She also noticed an odor.  She has had BV before and isn't sure if this is BV again.  She denies itching/burning.  No issues with intercourse.  She has not had any abnormal vaginal bleeding.     She would also like to have STI screening today for GC/CT/trich.     O:  /90 (BP Location: Right arm, Patient Position: Sitting)   Pulse 95   Ht 1.727 m (5' 8\")   Wt 85.7 kg (189 lb)   LMP 03/10/2025 (Exact Date)   BMI 28.74 kg/m²      Physical exam:   General Appearance   - consistent with stated age, well groomed and cooperative    Integumentary  - skin warm and dry without rash    Head and Neck  - normalocephalic and neck supple    Chest and Lung Exam  - normal breathing effort, no respiratory distress    Female Genitourinary  - vulva normal without rash or lesion, normal vaginal rugae, small amount of non-specific, tan-colored vaginal discharge    Peripheral Vascular  - no edema present    A/P: 29 y.o. female with vaginal discharge and odor.     - Small amount of non-specific discharge seen on exam.    - Vaginitis swab sent - will treat based on results.   - Pt also requested testing for GC/CT/trich - sent today    F/u as needed or for annual exam.     "

## 2025-04-05 ENCOUNTER — HOSPITAL ENCOUNTER (EMERGENCY)
Facility: HOSPITAL | Age: 30
Discharge: HOME | End: 2025-04-05
Payer: COMMERCIAL

## 2025-04-05 VITALS
OXYGEN SATURATION: 99 % | TEMPERATURE: 97.5 F | SYSTOLIC BLOOD PRESSURE: 165 MMHG | HEART RATE: 99 BPM | RESPIRATION RATE: 18 BRPM | DIASTOLIC BLOOD PRESSURE: 95 MMHG

## 2025-04-05 DIAGNOSIS — J02.9 ACUTE PHARYNGITIS, UNSPECIFIED ETIOLOGY: Primary | ICD-10-CM

## 2025-04-05 LAB
BV SCORE VAG QL: NORMAL
C TRACH RRNA SPEC QL NAA+PROBE: NOT DETECTED
FLUAV RNA RESP QL NAA+PROBE: NOT DETECTED
FLUBV RNA RESP QL NAA+PROBE: NOT DETECTED
N GONORRHOEA RRNA SPEC QL NAA+PROBE: NOT DETECTED
QUEST GC CT AMPLIFIED (ALWAYS MESSAGE): NORMAL
S PYO DNA THROAT QL NAA+PROBE: NOT DETECTED
SARS-COV-2 RNA RESP QL NAA+PROBE: NOT DETECTED
T VAGINALIS RRNA SPEC QL NAA+PROBE: NOT DETECTED

## 2025-04-05 PROCEDURE — 2500000005 HC RX 250 GENERAL PHARMACY W/O HCPCS: Performed by: PHYSICIAN ASSISTANT

## 2025-04-05 PROCEDURE — 2500000001 HC RX 250 WO HCPCS SELF ADMINISTERED DRUGS (ALT 637 FOR MEDICARE OP): Performed by: PHYSICIAN ASSISTANT

## 2025-04-05 PROCEDURE — 87651 STREP A DNA AMP PROBE: CPT | Performed by: PHYSICIAN ASSISTANT

## 2025-04-05 PROCEDURE — 87636 SARSCOV2 & INF A&B AMP PRB: CPT | Performed by: PHYSICIAN ASSISTANT

## 2025-04-05 PROCEDURE — 99283 EMERGENCY DEPT VISIT LOW MDM: CPT

## 2025-04-05 RX ORDER — ACETAMINOPHEN 500 MG
1000 TABLET ORAL EVERY 6 HOURS PRN
Qty: 40 TABLET | Refills: 0 | Status: SHIPPED | OUTPATIENT
Start: 2025-04-05 | End: 2025-04-10

## 2025-04-05 RX ORDER — NAPROXEN 500 MG/1
500 TABLET ORAL
Qty: 30 TABLET | Refills: 0 | Status: SHIPPED | OUTPATIENT
Start: 2025-04-05 | End: 2025-04-20

## 2025-04-05 RX ORDER — LIDOCAINE HYDROCHLORIDE 20 MG/ML
1.25 SOLUTION OROPHARYNGEAL AS NEEDED
Qty: 10 ML | Refills: 0 | Status: SHIPPED | OUTPATIENT
Start: 2025-04-05 | End: 2025-04-08

## 2025-04-05 RX ORDER — ACETAMINOPHEN 325 MG/1
975 TABLET ORAL ONCE
Status: COMPLETED | OUTPATIENT
Start: 2025-04-05 | End: 2025-04-05

## 2025-04-05 RX ORDER — LIDOCAINE HYDROCHLORIDE 20 MG/ML
1.25 SOLUTION OROPHARYNGEAL ONCE
Status: COMPLETED | OUTPATIENT
Start: 2025-04-05 | End: 2025-04-05

## 2025-04-05 RX ORDER — IBUPROFEN 400 MG/1
400 TABLET ORAL ONCE
Status: COMPLETED | OUTPATIENT
Start: 2025-04-05 | End: 2025-04-05

## 2025-04-05 RX ADMIN — ACETAMINOPHEN 975 MG: 325 TABLET ORAL at 22:22

## 2025-04-05 RX ADMIN — IBUPROFEN 400 MG: 400 TABLET, FILM COATED ORAL at 22:21

## 2025-04-05 RX ADMIN — LIDOCAINE HYDROCHLORIDE 1.25 ML: 20 SOLUTION ORAL at 22:21

## 2025-04-05 ASSESSMENT — LIFESTYLE VARIABLES
HAVE PEOPLE ANNOYED YOU BY CRITICIZING YOUR DRINKING: NO
HAVE YOU EVER FELT YOU SHOULD CUT DOWN ON YOUR DRINKING: NO
TOTAL SCORE: 0
EVER HAD A DRINK FIRST THING IN THE MORNING TO STEADY YOUR NERVES TO GET RID OF A HANGOVER: NO
EVER FELT BAD OR GUILTY ABOUT YOUR DRINKING: NO

## 2025-04-05 ASSESSMENT — PAIN SCALES - GENERAL: PAINLEVEL_OUTOF10: 9

## 2025-04-05 ASSESSMENT — PAIN - FUNCTIONAL ASSESSMENT: PAIN_FUNCTIONAL_ASSESSMENT: 0-10

## 2025-04-05 NOTE — Clinical Note
Mary Jackson was seen and treated in our emergency department on 4/5/2025.  She may return to work on 04/07/2025.       If you have any questions or concerns, please don't hesitate to call.      Alycia Monahan PA-C

## 2025-04-06 NOTE — ED PROVIDER NOTES
HPI   Chief Complaint   Patient presents with    Sore Throat       29-year-old female presents emergency department for complaints of a sore throat.  She states it has been going on for about 3 days.  She complains of painful swallowing.  She denies fevers, upper respiratory symptoms, cough, abdominal pain, vomiting.  She has been eating popsicles for pain relief.  She has not tried any over-the-counter medications.              Patient History   Past Medical History:   Diagnosis Date    12 weeks gestation of pregnancy ([facility]) 12/20/2019    12 weeks gestation of pregnancy    Abnormal Pap smear of cervix     Acute upper respiratory infection, unspecified 10/16/2019    Viral upper respiratory tract infection    Anemia     Anxiety     Arrhythmia     Chronic sinusitis, unspecified 03/16/2020    Other sinusitis    Depression     Disease of thyroid gland     Encounter for immunization 11/25/2020    Encounter for vaccination    Encounter for immunization 01/06/2021    Encounter for vaccination    Encounter for supervision of normal pregnancy, unspecified, unspecified trimester 07/10/2020    Prenatal care    Endocrine, nutritional and metabolic diseases complicating pregnancy, unspecified trimester ([facility]) 06/18/2020    Thyroid disease in pregnancy    HPV (human papilloma virus) infection     Hyperthyroidism     Other chest pain 10/02/2018    Atypical chest pain    Other conditions influencing health status 04/19/2019    History of pregnancy    Personal history of other diseases of the respiratory system 03/09/2020    History of sore throat    Personal history of other diseases of the respiratory system 10/16/2019    History of sore throat    Personal history of other infectious and parasitic diseases 03/09/2020    History of viral infection    Personal history of other specified conditions 04/01/2022    History of palpitations    Personal history of other specified conditions 03/19/2019    History of palpitations     Personal history of other specified conditions 05/03/2021    History of chest pain    Sacrococcygeal disorders, not elsewhere classified 05/10/2019    Tail bone pain     Past Surgical History:   Procedure Laterality Date    ABLATION OF DYSRHYTHMIC FOCUS      COLPOSCOPY      HERNIA REPAIR  07/27/2018    Hernia Repair    OTHER SURGICAL HISTORY  09/24/2021    Catheter ablation    OTHER SURGICAL HISTORY  05/03/2021    Eye surgery    OTHER SURGICAL HISTORY  05/03/2021    Hernia repair    OTHER SURGICAL HISTORY  05/03/2021    Tonsillectomy    TONSILLECTOMY  07/27/2018    Tonsillectomy     Family History   Problem Relation Name Age of Onset    Anxiety disorder Mother      Thyroid disease Other Paternal GGM     Thyroid disease Other Cousin     Thyroid disease Paternal Grandmother Marianne     Asthma Brother Bj     Alcohol abuse Father Doe     Diabetes Maternal Grandfather Don      Social History     Tobacco Use    Smoking status: Former     Types: Cigarettes    Smokeless tobacco: Never   Vaping Use    Vaping status: Never Used   Substance Use Topics    Alcohol use: Yes    Drug use: Never     Types: Marijuana       Physical Exam   ED Triage Vitals [04/05/25 2130]   Temperature Heart Rate Respirations BP   36.4 °C (97.5 °F) 99 18 (!) 165/95      Pulse Ox Temp Source Heart Rate Source Patient Position   99 % Temporal Monitor --      BP Location FiO2 (%)     Right arm --       Physical Exam  Vitals and nursing note reviewed.   Constitutional:       General: She is not in acute distress.  HENT:      Head: Atraumatic.      Mouth/Throat:      Mouth: Mucous membranes are moist.      Pharynx: Oropharynx is clear.      Comments: Erythematous pharynx with no edema or exudates.  No soft palate petechiae.  Uvula midline.  No asymmetry.  Pharynx patent.  Bilateral anterior cervical lymphadenopathy  Eyes:      Extraocular Movements: Extraocular movements intact.      Conjunctiva/sclera: Conjunctivae normal.      Pupils: Pupils are  equal, round, and reactive to light.   Cardiovascular:      Rate and Rhythm: Normal rate and regular rhythm.      Pulses: Normal pulses.   Pulmonary:      Effort: Pulmonary effort is normal. No respiratory distress.      Breath sounds: Normal breath sounds.   Abdominal:      General: There is no distension.      Palpations: Abdomen is soft.      Tenderness: There is no abdominal tenderness. There is no guarding or rebound.   Musculoskeletal:         General: No deformity.      Cervical back: Neck supple.   Skin:     General: Skin is warm and dry.   Neurological:      Mental Status: She is alert and oriented to person, place, and time. Mental status is at baseline.      Cranial Nerves: No cranial nerve deficit.      Sensory: No sensory deficit.      Motor: No weakness.   Psychiatric:         Mood and Affect: Mood normal.         Behavior: Behavior normal.           ED Course & MDM   Diagnoses as of 04/05/25 2236   Acute pharyngitis, unspecified etiology                 No data recorded     Caleb Coma Scale Score: 15 (04/05/25 2131 : Ayana Zapata RN)                           Medical Decision Making  29-year-old female presents emergency department for complaints of sore throat x 3 days.  On my exam, she has anterior cervical lymphadenopathy bilaterally and an erythematous pharynx with no edema or exudates.  I treated the patient with oral Tylenol, ibuprofen and viscous lidocaine  Strep, COVID, flu were all negative.  I prescribed the patient viscous lidocaine, Tylenol and Naprosyn.  Recommended follow-up with primary care.  Discussed results with patient and/or family/friend and recommended close follow up with primary care or specialist.  Reviewed return precautions at length.  I answered all questions.           Procedure  Procedures     Alycia Monahan PA-C  04/05/25 2236

## 2025-04-06 NOTE — ED TRIAGE NOTES
"Pt states sore throat for the last couple days that is worse today. Pt states it feels like \"a shock.\" Pt has not taken any tylenol, motrin, cold medicine, cough drops etc.  Pt denies any SOB, CP difficulty breathing, difficulty managing secretions.  "

## 2025-04-16 ENCOUNTER — OFFICE VISIT (OUTPATIENT)
Dept: OBSTETRICS AND GYNECOLOGY | Facility: CLINIC | Age: 30
End: 2025-04-16
Payer: COMMERCIAL

## 2025-04-16 VITALS
BODY MASS INDEX: 28.64 KG/M2 | DIASTOLIC BLOOD PRESSURE: 92 MMHG | HEIGHT: 68 IN | SYSTOLIC BLOOD PRESSURE: 136 MMHG | WEIGHT: 189 LBS

## 2025-04-16 DIAGNOSIS — N92.6 MISSED MENSES: Primary | ICD-10-CM

## 2025-04-16 PROCEDURE — 99211 OFF/OP EST MAY X REQ PHY/QHP: CPT

## 2025-04-16 PROCEDURE — 3008F BODY MASS INDEX DOCD: CPT

## 2025-04-17 LAB — B-HCG SERPL-ACNC: <5 MIU/ML

## 2025-04-19 NOTE — PROGRESS NOTES
"  Patient ID: Mary Jackson is a 29 y.o. female who presents for follow-up of palpitation  History of Present Illness  She is accompanied by her son  The patient, with a history of arrhythmias, presents with ongoing episodes of tachycardia. The episodes are brief, lasting approximately thirty seconds, and self-resolving. She is associated with chest discomfort and pressure, impacting her quality of life. The patient has been managing these episodes with magnesium, beta blockers, and calcium channel blockers, which have provided some palliative relief .  The patient's arrhythmias have been a long-standing issue, with the first episode occurring approximately five years ago during pregnancy.  Her symptoms of palpitation and change since post ablation, these intermittent palpitations are brief and bothersome.  We discussed that last EP study did not show any inducible tachycardia.    PMHx:  See list    PSHx:  See list    Social Hx:  Social History[1]    Family Hx:  Family History[2]    Review of Systems   Constitutional: Negative for malaise/fatigue.   Cardiovascular:  Positive for palpitations. Negative for claudication, cyanosis, irregular heartbeat, leg swelling, near-syncope, orthopnea, paroxysmal nocturnal dyspnea and syncope.   Respiratory:  Negative for cough, shortness of breath, snoring and wheezing.    All other systems reviewed and are negative.      Objective     BP (!) 130/92 (BP Location: Right arm, Patient Position: Sitting)   Pulse 79   Ht 1.727 m (5' 8\")   Wt 84.9 kg (187 lb 3.2 oz)   LMP 03/10/2025 (Exact Date)   BMI 28.46 kg/m²        LABS:  CBC:   Lab Results   Component Value Date    WBC 6.1 09/26/2024    RBC 4.63 09/26/2024    HGB 13.3 09/26/2024    HCT 40.5 09/26/2024    MCV 88 09/26/2024    MCH 28.7 09/26/2024    MCHC 32.8 09/26/2024    RDW 12.1 09/26/2024     09/26/2024     CBC with Differential:    Lab Results   Component Value Date    WBC 6.1 09/26/2024    RBC 4.63 09/26/2024    " HGB 13.3 09/26/2024    HCT 40.5 09/26/2024     09/26/2024    MCV 88 09/26/2024    MCH 28.7 09/26/2024    MCHC 32.8 09/26/2024    RDW 12.1 09/26/2024    NRBC 0.0 09/26/2024    LYMPHOPCT 24.3 09/26/2024    MONOPCT 5.9 09/26/2024    EOSPCT 3.9 09/26/2024    BASOPCT 1.0 09/26/2024    MONOSABS 0.36 09/26/2024    LYMPHSABS 1.48 09/26/2024    EOSABS 0.24 09/26/2024    BASOSABS 0.06 09/26/2024     CMP:    Lab Results   Component Value Date     09/26/2024    K 4.5 09/26/2024     09/26/2024    CO2 25 09/26/2024    BUN 9 09/26/2024    CREATININE 0.85 09/26/2024    GLUCOSE 79 09/26/2024    PROT 7.1 08/03/2024    CALCIUM 9.2 09/26/2024    BILITOT 0.3 08/03/2024    ALKPHOS 58 08/03/2024    AST 28 08/03/2024    ALT 29 08/03/2024     BMP:    Lab Results   Component Value Date     09/26/2024    K 4.5 09/26/2024     09/26/2024    CO2 25 09/26/2024    BUN 9 09/26/2024    CREATININE 0.85 09/26/2024    CALCIUM 9.2 09/26/2024    GLUCOSE 79 09/26/2024     Magnesium:  Lab Results   Component Value Date    MG 2.02 09/26/2024           Constitutional:       Appearance: Normal and healthy appearance. Well-developed, overweight and not in distress.   Neck:      Vascular: No JVR. JVD normal.   Pulmonary:      Effort: Pulmonary effort is normal.      Breath sounds: Normal breath sounds. No wheezing. No rhonchi. No rales.   Chest:      Chest wall: Not tender to palpatation.   Cardiovascular:      PMI at left midclavicular line. Normal rate. Regular rhythm. Normal S1. Normal S2.       Murmurs: There is no murmur.      No gallop.  No click. No rub.   Pulses:     Intact distal pulses.   Edema:     Peripheral edema absent.   Abdominal:      Tenderness: There is no abdominal tenderness.   Musculoskeletal: Normal range of motion.         General: No tenderness. Skin:     General: Skin is warm and dry.   Neurological:      General: No focal deficit present.      Mental Status: Alert and oriented to person, place and time.          Device check. See scanned.  ECG. See scanned.    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  Discussed again that 20% chance arrhythmia may not be inducible during EP study. Discussed loop recorder for documentation in past. Discussed potential need for drug load and extended hospital stay if EP study is negative.  - Order echocardiogram to assess cardiac structure.  - Given symptoms, plan EP study with possible ablation under MAC sedation.  - Consider isoproterenol during EP study to induce arrhythmia.  - If EP study is negative, consider antiarrhythmic drug load with propafenone after EP testing  - If EP study cannot be preauthorized, plan for a two-week monitor for EP study  - Refer to Dr. Verdugo for further evaluation if needed of cardiac substrate    No family history of SIDS or sudden cardiac arrest    Counseled greater than 50% of the visit.  The patient and I discussed arrhythmia, ECG, shared decision making, preoperative cardiac evaluation informed consent, possibility of no inducible arrhythmia, treatment options, risk, benefits, and imponderables.  Lifestyle modifications reviewed.  All questions answered.  Patient appreciative of care  Assessment & Plan  Paroxysmal SVT (supraventricular tachycardia) (CMS-HCC)    Palpitations    PAC (premature atrial contraction)    Inappropriate sinus tachycardia (CMS-HCC)    Former smoker    Body mass index (BMI) of 28.0 to 28.9 in adult    Encounter to discuss treatment options    Encounter for medication review and counseling    Encounter to discuss test  results      Gogo Romero LPN   I, GOGO ROMERO LPN, AM SCRIBING FOR AND IN THE PRESENCE OF DR. JENNIFER RM MD, [facility], [facility], [facility]  I, , personally performed the services described in the documentation as scribed by the nurse in my presence, and confirm it is both accurate and complete.     This medical note was created with the assistance of artificial intelligence (AI) for documentation purposes. The content has been reviewed and confirmed by the healthcare provider for accuracy and completeness. Patient consented to the use of audio recording and use of AI during their visit.     Total, 41 inclusive of preoperative cardiac evaluation, review of anatomy, ECGs, and prior EP studies and ablation       [1]   Social History  Socioeconomic History    Marital status: Single   Tobacco Use    Smoking status: Former     Types: Cigarettes    Smokeless tobacco: Never   Vaping Use    Vaping status: Never Used   Substance and Sexual Activity    Alcohol use: Yes     Comment: occasional    Drug use: Not Currently     Types: Marijuana    Sexual activity: Yes     Partners: Male     Birth control/protection: OCP     Social Drivers of Health     Financial Resource Strain: Low Risk  (9/6/2024)    Overall Financial Resource Strain (CARDIA)     Difficulty of Paying Living Expenses: Not hard at all   Food Insecurity: No Food Insecurity (9/6/2024)    Hunger Vital Sign     Worried About Running Out of Food in the Last Year: Never true     Ran Out of Food in the Last Year: Never true   Transportation Needs: No Transportation Needs (9/6/2024)    PRAPARE - Transportation     Lack of Transportation (Medical): No     Lack of Transportation (Non-Medical): No   Physical Activity: Inactive (9/6/2024)    Exercise Vital Sign     Days of Exercise per Week: 0 days     Minutes of Exercise per Session: 0 min   [2]   Family History  Problem Relation Name Age of Onset    Anxiety disorder Mother      Thyroid disease Other Paternal GGM      Thyroid disease Other Cousin     Thyroid disease Paternal Grandmother Marianne     Asthma Brother Bj     Alcohol abuse Father Doe     Diabetes Maternal Grandfather Don

## 2025-04-19 NOTE — H&P (VIEW-ONLY)
"  Patient ID: Mary Jackson is a 29 y.o. female who presents for follow-up of palpitation  History of Present Illness  She is accompanied by her son  The patient, with a history of arrhythmias, presents with ongoing episodes of tachycardia. The episodes are brief, lasting approximately thirty seconds, and self-resolving. She is associated with chest discomfort and pressure, impacting her quality of life. The patient has been managing these episodes with magnesium, beta blockers, and calcium channel blockers, which have provided some palliative relief .  The patient's arrhythmias have been a long-standing issue, with the first episode occurring approximately five years ago during pregnancy.  Her symptoms of palpitation and change since post ablation, these intermittent palpitations are brief and bothersome.  We discussed that last EP study did not show any inducible tachycardia.    PMHx:  See list    PSHx:  See list    Social Hx:  Social History[1]    Family Hx:  Family History[2]    Review of Systems   Constitutional: Negative for malaise/fatigue.   Cardiovascular:  Positive for palpitations. Negative for claudication, cyanosis, irregular heartbeat, leg swelling, near-syncope, orthopnea, paroxysmal nocturnal dyspnea and syncope.   Respiratory:  Negative for cough, shortness of breath, snoring and wheezing.    All other systems reviewed and are negative.      Objective     BP (!) 130/92 (BP Location: Right arm, Patient Position: Sitting)   Pulse 79   Ht 1.727 m (5' 8\")   Wt 84.9 kg (187 lb 3.2 oz)   LMP 03/10/2025 (Exact Date)   BMI 28.46 kg/m²        LABS:  CBC:   Lab Results   Component Value Date    WBC 6.1 09/26/2024    RBC 4.63 09/26/2024    HGB 13.3 09/26/2024    HCT 40.5 09/26/2024    MCV 88 09/26/2024    MCH 28.7 09/26/2024    MCHC 32.8 09/26/2024    RDW 12.1 09/26/2024     09/26/2024     CBC with Differential:    Lab Results   Component Value Date    WBC 6.1 09/26/2024    RBC 4.63 09/26/2024    " HGB 13.3 09/26/2024    HCT 40.5 09/26/2024     09/26/2024    MCV 88 09/26/2024    MCH 28.7 09/26/2024    MCHC 32.8 09/26/2024    RDW 12.1 09/26/2024    NRBC 0.0 09/26/2024    LYMPHOPCT 24.3 09/26/2024    MONOPCT 5.9 09/26/2024    EOSPCT 3.9 09/26/2024    BASOPCT 1.0 09/26/2024    MONOSABS 0.36 09/26/2024    LYMPHSABS 1.48 09/26/2024    EOSABS 0.24 09/26/2024    BASOSABS 0.06 09/26/2024     CMP:    Lab Results   Component Value Date     09/26/2024    K 4.5 09/26/2024     09/26/2024    CO2 25 09/26/2024    BUN 9 09/26/2024    CREATININE 0.85 09/26/2024    GLUCOSE 79 09/26/2024    PROT 7.1 08/03/2024    CALCIUM 9.2 09/26/2024    BILITOT 0.3 08/03/2024    ALKPHOS 58 08/03/2024    AST 28 08/03/2024    ALT 29 08/03/2024     BMP:    Lab Results   Component Value Date     09/26/2024    K 4.5 09/26/2024     09/26/2024    CO2 25 09/26/2024    BUN 9 09/26/2024    CREATININE 0.85 09/26/2024    CALCIUM 9.2 09/26/2024    GLUCOSE 79 09/26/2024     Magnesium:  Lab Results   Component Value Date    MG 2.02 09/26/2024           Constitutional:       Appearance: Normal and healthy appearance. Well-developed, overweight and not in distress.   Neck:      Vascular: No JVR. JVD normal.   Pulmonary:      Effort: Pulmonary effort is normal.      Breath sounds: Normal breath sounds. No wheezing. No rhonchi. No rales.   Chest:      Chest wall: Not tender to palpatation.   Cardiovascular:      PMI at left midclavicular line. Normal rate. Regular rhythm. Normal S1. Normal S2.       Murmurs: There is no murmur.      No gallop.  No click. No rub.   Pulses:     Intact distal pulses.   Edema:     Peripheral edema absent.   Abdominal:      Tenderness: There is no abdominal tenderness.   Musculoskeletal: Normal range of motion.         General: No tenderness. Skin:     General: Skin is warm and dry.   Neurological:      General: No focal deficit present.      Mental Status: Alert and oriented to person, place and time.          Device check. See scanned.  ECG. See scanned.    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  Discussed again that 20% chance arrhythmia may not be inducible during EP study. Discussed loop recorder for documentation in past. Discussed potential need for drug load and extended hospital stay if EP study is negative.  - Order echocardiogram to assess cardiac structure.  - Given symptoms, plan EP study with possible ablation under MAC sedation.  - Consider isoproterenol during EP study to induce arrhythmia.  - If EP study is negative, consider antiarrhythmic drug load with propafenone after EP testing  - If EP study cannot be preauthorized, plan for a two-week monitor for EP study  - Refer to Dr. Verdugo for further evaluation if needed of cardiac substrate    No family history of SIDS or sudden cardiac arrest    Counseled greater than 50% of the visit.  The patient and I discussed arrhythmia, ECG, shared decision making, preoperative cardiac evaluation informed consent, possibility of no inducible arrhythmia, treatment options, risk, benefits, and imponderables.  Lifestyle modifications reviewed.  All questions answered.  Patient appreciative of care  Assessment & Plan  Paroxysmal SVT (supraventricular tachycardia) (CMS-HCC)    Palpitations    PAC (premature atrial contraction)    Inappropriate sinus tachycardia (CMS-HCC)    Former smoker    Body mass index (BMI) of 28.0 to 28.9 in adult    Encounter to discuss treatment options    Encounter for medication review and counseling    Encounter to discuss test  results      Gogo Romero LPN   I, GOGO ROMERO LPN, AM SCRIBING FOR AND IN THE PRESENCE OF DR. JENNIFER RM MD, [facility], [facility], [facility]  I, , personally performed the services described in the documentation as scribed by the nurse in my presence, and confirm it is both accurate and complete.     This medical note was created with the assistance of artificial intelligence (AI) for documentation purposes. The content has been reviewed and confirmed by the healthcare provider for accuracy and completeness. Patient consented to the use of audio recording and use of AI during their visit.     Total, 41 inclusive of preoperative cardiac evaluation, review of anatomy, ECGs, and prior EP studies and ablation       [1]   Social History  Socioeconomic History    Marital status: Single   Tobacco Use    Smoking status: Former     Types: Cigarettes    Smokeless tobacco: Never   Vaping Use    Vaping status: Never Used   Substance and Sexual Activity    Alcohol use: Yes     Comment: occasional    Drug use: Not Currently     Types: Marijuana    Sexual activity: Yes     Partners: Male     Birth control/protection: OCP     Social Drivers of Health     Financial Resource Strain: Low Risk  (9/6/2024)    Overall Financial Resource Strain (CARDIA)     Difficulty of Paying Living Expenses: Not hard at all   Food Insecurity: No Food Insecurity (9/6/2024)    Hunger Vital Sign     Worried About Running Out of Food in the Last Year: Never true     Ran Out of Food in the Last Year: Never true   Transportation Needs: No Transportation Needs (9/6/2024)    PRAPARE - Transportation     Lack of Transportation (Medical): No     Lack of Transportation (Non-Medical): No   Physical Activity: Inactive (9/6/2024)    Exercise Vital Sign     Days of Exercise per Week: 0 days     Minutes of Exercise per Session: 0 min   [2]   Family History  Problem Relation Name Age of Onset    Anxiety disorder Mother      Thyroid disease Other Paternal GGM      Thyroid disease Other Cousin     Thyroid disease Paternal Grandmother Marianne     Asthma Brother Bj     Alcohol abuse Father Doe     Diabetes Maternal Grandfather Don

## 2025-04-22 ENCOUNTER — APPOINTMENT (OUTPATIENT)
Dept: CARDIOLOGY | Facility: CLINIC | Age: 30
End: 2025-04-22
Payer: COMMERCIAL

## 2025-04-22 VITALS
BODY MASS INDEX: 28.37 KG/M2 | HEIGHT: 68 IN | WEIGHT: 187.2 LBS | DIASTOLIC BLOOD PRESSURE: 92 MMHG | SYSTOLIC BLOOD PRESSURE: 130 MMHG | HEART RATE: 79 BPM

## 2025-04-22 DIAGNOSIS — I47.11 INAPPROPRIATE SINUS TACHYCARDIA (CMS-HCC): ICD-10-CM

## 2025-04-22 DIAGNOSIS — Z71.2 ENCOUNTER TO DISCUSS TEST RESULTS: ICD-10-CM

## 2025-04-22 DIAGNOSIS — Z71.89 ENCOUNTER FOR MEDICATION REVIEW AND COUNSELING: ICD-10-CM

## 2025-04-22 DIAGNOSIS — I49.1 PAC (PREMATURE ATRIAL CONTRACTION): ICD-10-CM

## 2025-04-22 DIAGNOSIS — I47.10 PAROXYSMAL SVT (SUPRAVENTRICULAR TACHYCARDIA) (CMS-HCC): Primary | ICD-10-CM

## 2025-04-22 DIAGNOSIS — Z71.89 ENCOUNTER TO DISCUSS TREATMENT OPTIONS: ICD-10-CM

## 2025-04-22 DIAGNOSIS — R00.2 PALPITATIONS: ICD-10-CM

## 2025-04-22 DIAGNOSIS — Z87.891 FORMER SMOKER: ICD-10-CM

## 2025-04-22 PROCEDURE — 93000 ELECTROCARDIOGRAM COMPLETE: CPT | Performed by: INTERNAL MEDICINE

## 2025-04-22 PROCEDURE — 3008F BODY MASS INDEX DOCD: CPT | Performed by: INTERNAL MEDICINE

## 2025-04-22 PROCEDURE — 99215 OFFICE O/P EST HI 40 MIN: CPT | Performed by: INTERNAL MEDICINE

## 2025-04-22 PROCEDURE — 1036F TOBACCO NON-USER: CPT | Performed by: INTERNAL MEDICINE

## 2025-04-22 RX ORDER — SODIUM CHLORIDE 9 MG/ML
100 INJECTION, SOLUTION INTRAVENOUS CONTINUOUS
OUTPATIENT
Start: 2025-04-22 | End: 2025-04-23

## 2025-04-22 ASSESSMENT — ENCOUNTER SYMPTOMS
IRREGULAR HEARTBEAT: 0
SNORING: 0
ORTHOPNEA: 0
PND: 0
CLAUDICATION: 0
PALPITATIONS: 1
COUGH: 0
SYNCOPE: 0
NEAR-SYNCOPE: 0
SHORTNESS OF BREATH: 0
WHEEZING: 0

## 2025-04-22 NOTE — PATIENT INSTRUCTIONS
Pre-Procedure Patient Information    You have been scheduled for: EP Study, possible ablation- echocardiogram prior  At: [facility]  With: Dr. Montiel   Date of procedure: Wednesday, May 14, 2025    1. Please have transportation to and from the hospital. While you should plan for same-day discharge there is a possibility you will need to stay overnight.    2. You will receive a call from the hospital 24 hours before your procedure providing you with fasting instructions, procedure location detail, and time of arrival.  If you have not received a call from the hospital by 6 pm the day before your scheduled procedure, please call [phone].    3. Please bring a current list of medications with you to the hospital.      4. Medications to hold:     None,  you may continue your medications in the morning with sips of water.     5. Nothing to eat after midnight before the procedure. OK to take morning medications, with above exceptions, the day of the procedure with a small sip of water.     6. Please bring to our attention if you have any contrast, latex or metal allergies.    7. Please have your blood work as instructed completed at least a day before your procedure.    8. If you have any questions, please contact the office at [phone].      We will refer you to Dr. Verdugo after your procedure is done

## 2025-04-28 ENCOUNTER — PATIENT MESSAGE (OUTPATIENT)
Dept: CARDIOLOGY | Facility: CLINIC | Age: 30
End: 2025-04-28
Payer: COMMERCIAL

## 2025-04-29 ENCOUNTER — TELEPHONE (OUTPATIENT)
Dept: CARDIOLOGY | Facility: CLINIC | Age: 30
End: 2025-04-29
Payer: COMMERCIAL

## 2025-05-07 ENCOUNTER — DOCUMENTATION (OUTPATIENT)
Dept: CARDIOLOGY | Facility: CLINIC | Age: 30
End: 2025-05-07
Payer: COMMERCIAL

## 2025-05-07 NOTE — PROGRESS NOTES
[facility] paperwork filled out and faxed back to [facility] at [phone] with successful confirmation. Will scan a copy to the chart as well.

## 2025-05-08 ENCOUNTER — LAB (OUTPATIENT)
Dept: LAB | Facility: HOSPITAL | Age: 30
End: 2025-05-08
Payer: COMMERCIAL

## 2025-05-08 ENCOUNTER — PATIENT MESSAGE (OUTPATIENT)
Dept: CARDIOLOGY | Facility: CLINIC | Age: 30
End: 2025-05-08

## 2025-05-08 ENCOUNTER — HOSPITAL ENCOUNTER (OUTPATIENT)
Dept: CARDIOLOGY | Facility: CLINIC | Age: 30
Discharge: HOME | End: 2025-05-08
Payer: COMMERCIAL

## 2025-05-08 DIAGNOSIS — I47.10 SUPRAVENTRICULAR TACHYCARDIA, UNSPECIFIED: Primary | ICD-10-CM

## 2025-05-08 DIAGNOSIS — I47.11 INAPPROPRIATE SINUS TACHYCARDIA (CMS-HCC): ICD-10-CM

## 2025-05-08 DIAGNOSIS — I47.10 PAROXYSMAL SVT (SUPRAVENTRICULAR TACHYCARDIA) (CMS-HCC): ICD-10-CM

## 2025-05-08 DIAGNOSIS — I47.11 INAPPROPRIATE SINUS TACHYCARDIA, SO STATED: ICD-10-CM

## 2025-05-08 LAB
ANION GAP SERPL CALC-SCNC: 15 MMOL/L (ref 10–20)
AORTIC VALVE MEAN GRADIENT: 4 MMHG
AORTIC VALVE PEAK VELOCITY: 1.41 M/S
AV PEAK GRADIENT: 8 MMHG
AVA (PEAK VEL): 2.08 CM2
AVA (VTI): 2.27 CM2
BUN SERPL-MCNC: 7 MG/DL (ref 6–23)
CALCIUM SERPL-MCNC: 8.8 MG/DL (ref 8.6–10.3)
CHLORIDE SERPL-SCNC: 103 MMOL/L (ref 98–107)
CO2 SERPL-SCNC: 26 MMOL/L (ref 21–32)
CREAT SERPL-MCNC: 0.76 MG/DL (ref 0.5–1.05)
EGFRCR SERPLBLD CKD-EPI 2021: >90 ML/MIN/1.73M*2
EJECTION FRACTION APICAL 4 CHAMBER: 55.8
EJECTION FRACTION: 60 %
ERYTHROCYTE [DISTWIDTH] IN BLOOD BY AUTOMATED COUNT: 11.8 % (ref 11.5–14.5)
GLUCOSE SERPL-MCNC: 87 MG/DL (ref 74–99)
HCT VFR BLD AUTO: 38.5 % (ref 36–46)
HGB BLD-MCNC: 13.4 G/DL (ref 12–16)
INR PPP: 1 (ref 0.9–1.1)
LEFT VENTRICLE INTERNAL DIMENSION DIASTOLE: 3.8 CM (ref 3.5–6)
LEFT VENTRICULAR OUTFLOW TRACT DIAMETER: 2 CM
LV EJECTION FRACTION BIPLANE: 59 %
MCH RBC QN AUTO: 29.7 PG (ref 26–34)
MCHC RBC AUTO-ENTMCNC: 34.8 G/DL (ref 32–36)
MCV RBC AUTO: 85 FL (ref 80–100)
MITRAL VALVE E/A RATIO: 1.43
MITRAL VALVE E/E' RATIO: 15.7
NRBC BLD-RTO: 0 /100 WBCS (ref 0–0)
PLATELET # BLD AUTO: 271 X10*3/UL (ref 150–450)
POTASSIUM SERPL-SCNC: 4.2 MMOL/L (ref 3.5–5.3)
PROTHROMBIN TIME: 10.9 SECONDS (ref 9.8–12.4)
RBC # BLD AUTO: 4.51 X10*6/UL (ref 4–5.2)
RIGHT VENTRICLE PEAK SYSTOLIC PRESSURE: 28.2 MMHG
SODIUM SERPL-SCNC: 140 MMOL/L (ref 136–145)
WBC # BLD AUTO: 4.5 X10*3/UL (ref 4.4–11.3)

## 2025-05-08 PROCEDURE — 85027 COMPLETE CBC AUTOMATED: CPT

## 2025-05-08 PROCEDURE — 85610 PROTHROMBIN TIME: CPT

## 2025-05-08 PROCEDURE — 80048 BASIC METABOLIC PNL TOTAL CA: CPT

## 2025-05-08 PROCEDURE — 93306 TTE W/DOPPLER COMPLETE: CPT

## 2025-05-08 PROCEDURE — 93306 TTE W/DOPPLER COMPLETE: CPT | Performed by: INTERNAL MEDICINE

## 2025-05-13 ENCOUNTER — PATIENT MESSAGE (OUTPATIENT)
Dept: CARDIOLOGY | Facility: CLINIC | Age: 30
End: 2025-05-13
Payer: COMMERCIAL

## 2025-05-14 ENCOUNTER — ANESTHESIA (OUTPATIENT)
Dept: CARDIOLOGY | Facility: HOSPITAL | Age: 30
End: 2025-05-14
Payer: COMMERCIAL

## 2025-05-14 ENCOUNTER — APPOINTMENT (OUTPATIENT)
Dept: CARDIOLOGY | Facility: HOSPITAL | Age: 30
End: 2025-05-14
Payer: COMMERCIAL

## 2025-05-14 ENCOUNTER — HOSPITAL ENCOUNTER (OUTPATIENT)
Facility: HOSPITAL | Age: 30
Setting detail: OUTPATIENT SURGERY
Discharge: HOME | End: 2025-05-14
Attending: INTERNAL MEDICINE | Admitting: INTERNAL MEDICINE
Payer: COMMERCIAL

## 2025-05-14 ENCOUNTER — ANESTHESIA EVENT (OUTPATIENT)
Dept: CARDIOLOGY | Facility: HOSPITAL | Age: 30
End: 2025-05-14
Payer: COMMERCIAL

## 2025-05-14 VITALS
RESPIRATION RATE: 18 BRPM | SYSTOLIC BLOOD PRESSURE: 151 MMHG | OXYGEN SATURATION: 98 % | BODY MASS INDEX: 28.37 KG/M2 | DIASTOLIC BLOOD PRESSURE: 94 MMHG | HEIGHT: 68 IN | HEART RATE: 94 BPM | WEIGHT: 187.17 LBS | TEMPERATURE: 97.9 F

## 2025-05-14 DIAGNOSIS — I47.10 PAROXYSMAL SUPRAVENTRICULAR TACHYCARDIA: ICD-10-CM

## 2025-05-14 DIAGNOSIS — I47.10 PAROXYSMAL SVT (SUPRAVENTRICULAR TACHYCARDIA): Primary | ICD-10-CM

## 2025-05-14 DIAGNOSIS — I47.11 INAPPROPRIATE SINUS TACHYCARDIA: ICD-10-CM

## 2025-05-14 DIAGNOSIS — R00.2 PALPITATIONS: ICD-10-CM

## 2025-05-14 LAB
ANION GAP SERPL CALC-SCNC: 13 MMOL/L (ref 10–20)
B-HCG SERPL-ACNC: <2 MIU/ML
BUN SERPL-MCNC: 10 MG/DL (ref 6–23)
CALCIUM SERPL-MCNC: 8.7 MG/DL (ref 8.6–10.3)
CHLORIDE SERPL-SCNC: 105 MMOL/L (ref 98–107)
CO2 SERPL-SCNC: 22 MMOL/L (ref 21–32)
CREAT SERPL-MCNC: 0.7 MG/DL (ref 0.5–1.05)
EGFRCR SERPLBLD CKD-EPI 2021: >90 ML/MIN/1.73M*2
GLUCOSE SERPL-MCNC: 84 MG/DL (ref 74–99)
POTASSIUM SERPL-SCNC: 4 MMOL/L (ref 3.5–5.3)
SODIUM SERPL-SCNC: 136 MMOL/L (ref 136–145)

## 2025-05-14 PROCEDURE — 7100000009 HC PHASE TWO TIME - INITIAL BASE CHARGE: Performed by: INTERNAL MEDICINE

## 2025-05-14 PROCEDURE — C1769 GUIDE WIRE: HCPCS | Performed by: INTERNAL MEDICINE

## 2025-05-14 PROCEDURE — 2500000004 HC RX 250 GENERAL PHARMACY W/ HCPCS (ALT 636 FOR OP/ED): Mod: JW | Performed by: NURSE ANESTHETIST, CERTIFIED REGISTERED

## 2025-05-14 PROCEDURE — 2500000005 HC RX 250 GENERAL PHARMACY W/O HCPCS: Performed by: NURSE PRACTITIONER

## 2025-05-14 PROCEDURE — C1732 CATH, EP, DIAG/ABL, 3D/VECT: HCPCS | Performed by: INTERNAL MEDICINE

## 2025-05-14 PROCEDURE — 3700000001 HC GENERAL ANESTHESIA TIME - INITIAL BASE CHARGE: Performed by: INTERNAL MEDICINE

## 2025-05-14 PROCEDURE — 7100000002 HC RECOVERY ROOM TIME - EACH INCREMENTAL 1 MINUTE: Performed by: INTERNAL MEDICINE

## 2025-05-14 PROCEDURE — 3700000002 HC GENERAL ANESTHESIA TIME - EACH INCREMENTAL 1 MINUTE: Performed by: INTERNAL MEDICINE

## 2025-05-14 PROCEDURE — 93010 ELECTROCARDIOGRAM REPORT: CPT | Performed by: INTERNAL MEDICINE

## 2025-05-14 PROCEDURE — C1730 CATH, EP, 19 OR FEW ELECT: HCPCS | Performed by: INTERNAL MEDICINE

## 2025-05-14 PROCEDURE — 80048 BASIC METABOLIC PNL TOTAL CA: CPT | Performed by: NURSE PRACTITIONER

## 2025-05-14 PROCEDURE — 93623 PRGRMD STIMJ&PACG IV RX NFS: CPT | Performed by: INTERNAL MEDICINE

## 2025-05-14 PROCEDURE — C1733 CATH, EP, OTHR THAN COOL-TIP: HCPCS | Performed by: INTERNAL MEDICINE

## 2025-05-14 PROCEDURE — 2500000004 HC RX 250 GENERAL PHARMACY W/ HCPCS (ALT 636 FOR OP/ED): Mod: JZ | Performed by: INTERNAL MEDICINE

## 2025-05-14 PROCEDURE — 93005 ELECTROCARDIOGRAM TRACING: CPT | Mod: 59

## 2025-05-14 PROCEDURE — 84702 CHORIONIC GONADOTROPIN TEST: CPT | Performed by: NURSE PRACTITIONER

## 2025-05-14 PROCEDURE — 7100000001 HC RECOVERY ROOM TIME - INITIAL BASE CHARGE: Performed by: INTERNAL MEDICINE

## 2025-05-14 PROCEDURE — 7100000010 HC PHASE TWO TIME - EACH INCREMENTAL 1 MINUTE: Performed by: INTERNAL MEDICINE

## 2025-05-14 PROCEDURE — 93653 COMPRE EP EVAL TX SVT: CPT | Performed by: INTERNAL MEDICINE

## 2025-05-14 PROCEDURE — 2720000007 HC OR 272 NO HCPCS: Performed by: INTERNAL MEDICINE

## 2025-05-14 PROCEDURE — 93653 COMPRE EP EVAL TX SVT: CPT | Mod: 22 | Performed by: INTERNAL MEDICINE

## 2025-05-14 PROCEDURE — 36415 COLL VENOUS BLD VENIPUNCTURE: CPT | Performed by: NURSE PRACTITIONER

## 2025-05-14 PROCEDURE — 2500000001 HC RX 250 WO HCPCS SELF ADMINISTERED DRUGS (ALT 637 FOR MEDICARE OP): Performed by: NURSE PRACTITIONER

## 2025-05-14 PROCEDURE — 76942 ECHO GUIDE FOR BIOPSY: CPT | Mod: 59 | Performed by: INTERNAL MEDICINE

## 2025-05-14 PROCEDURE — 2500000004 HC RX 250 GENERAL PHARMACY W/ HCPCS (ALT 636 FOR OP/ED): Mod: JZ | Performed by: NURSE ANESTHETIST, CERTIFIED REGISTERED

## 2025-05-14 RX ORDER — ASPIRIN 325 MG
325 TABLET ORAL DAILY
Status: DISCONTINUED | OUTPATIENT
Start: 2025-05-14 | End: 2025-05-14 | Stop reason: HOSPADM

## 2025-05-14 RX ORDER — ADENOSINE 3 MG/ML
INJECTION INTRAVENOUS AS NEEDED
Status: DISCONTINUED | OUTPATIENT
Start: 2025-05-14 | End: 2025-05-14

## 2025-05-14 RX ORDER — PROPOFOL 10 MG/ML
INJECTION, EMULSION INTRAVENOUS CONTINUOUS PRN
Status: DISCONTINUED | OUTPATIENT
Start: 2025-05-14 | End: 2025-05-14

## 2025-05-14 RX ORDER — CHLORHEXIDINE GLUCONATE 40 MG/ML
SOLUTION TOPICAL ONCE
Status: COMPLETED | OUTPATIENT
Start: 2025-05-14 | End: 2025-05-14

## 2025-05-14 RX ORDER — ASPIRIN 325 MG
325 TABLET ORAL DAILY
Qty: 28 TABLET | Refills: 0 | Status: SHIPPED | OUTPATIENT
Start: 2025-05-14 | End: 2025-06-11

## 2025-05-14 RX ORDER — SODIUM CHLORIDE 9 MG/ML
INJECTION, SOLUTION INTRAVENOUS CONTINUOUS PRN
Status: DISCONTINUED | OUTPATIENT
Start: 2025-05-14 | End: 2025-05-14

## 2025-05-14 RX ORDER — ACETAMINOPHEN 325 MG/1
650 TABLET ORAL EVERY 4 HOURS PRN
Status: DISCONTINUED | OUTPATIENT
Start: 2025-05-14 | End: 2025-05-14 | Stop reason: HOSPADM

## 2025-05-14 RX ORDER — LANOLIN ALCOHOL/MO/W.PET/CERES
1 CREAM (GRAM) TOPICAL 2 TIMES DAILY
Start: 2025-05-14

## 2025-05-14 RX ORDER — LIDOCAINE HYDROCHLORIDE 10 MG/ML
INJECTION, SOLUTION EPIDURAL; INFILTRATION; INTRACAUDAL; PERINEURAL AS NEEDED
Status: DISCONTINUED | OUTPATIENT
Start: 2025-05-14 | End: 2025-05-14 | Stop reason: HOSPADM

## 2025-05-14 RX ORDER — ONDANSETRON HYDROCHLORIDE 2 MG/ML
4 INJECTION, SOLUTION INTRAVENOUS EVERY 8 HOURS PRN
Status: DISCONTINUED | OUTPATIENT
Start: 2025-05-14 | End: 2025-05-14 | Stop reason: HOSPADM

## 2025-05-14 RX ORDER — ONDANSETRON 4 MG/1
4 TABLET, FILM COATED ORAL EVERY 8 HOURS PRN
Status: DISCONTINUED | OUTPATIENT
Start: 2025-05-14 | End: 2025-05-14 | Stop reason: HOSPADM

## 2025-05-14 RX ADMIN — PROPOFOL 50 MG: 10 INJECTION, EMULSION INTRAVENOUS at 10:36

## 2025-05-14 RX ADMIN — ASPIRIN 325 MG: 325 TABLET ORAL at 18:10

## 2025-05-14 RX ADMIN — PROPOFOL 100 MCG/KG/MIN: 10 INJECTION, EMULSION INTRAVENOUS at 10:36

## 2025-05-14 RX ADMIN — PROPOFOL 40 MG: 10 INJECTION, EMULSION INTRAVENOUS at 11:01

## 2025-05-14 RX ADMIN — ADENOSINE 12 MG: 3 INJECTION INTRAVENOUS at 11:25

## 2025-05-14 RX ADMIN — PROPOFOL 50 MG: 10 INJECTION, EMULSION INTRAVENOUS at 11:49

## 2025-05-14 RX ADMIN — SODIUM CHLORIDE: 9 INJECTION, SOLUTION INTRAVENOUS at 10:28

## 2025-05-14 RX ADMIN — Medication: at 09:46

## 2025-05-14 RX ADMIN — PROPOFOL 40 MG: 10 INJECTION, EMULSION INTRAVENOUS at 10:50

## 2025-05-14 RX ADMIN — ADENOSINE 6 MG: 3 INJECTION INTRAVENOUS at 11:21

## 2025-05-14 ASSESSMENT — PAIN - FUNCTIONAL ASSESSMENT: PAIN_FUNCTIONAL_ASSESSMENT: 0-10

## 2025-05-14 ASSESSMENT — PAIN SCALES - GENERAL
PAINLEVEL_OUTOF10: 0 - NO PAIN
PAIN_LEVEL: 0
PAINLEVEL_OUTOF10: 0 - NO PAIN

## 2025-05-14 NOTE — NURSING NOTE
Printed discharge instructions reviewed including:  restrictions post sedation, groin site care/precautions, s/s of bleeding/hematoma and what to do if it occurs reviewed and reinforced. Follow up appts, home going med list, including ASA, reviewed with ASA given with pt told to start tomorrow. Pt's BP has been reading on the higher side since arrival with pt stated her BP is always high and she has mentioned it to her primary MD with no medication changes.  Advised pt to monitor BP at home and to notify Dr Montiel's office with BP results if it continues to be elevated. Pt instructed to read all instructions completely and to call Dr Montiel with any questions. Pt verbalized understanding of instructions. R groin site tender but remains soft with alvin D&I.

## 2025-05-14 NOTE — NURSING NOTE
Pt ambulated to and from bathroom with slow steady gait with groin site remaining soft and drsg D&I. Pt's groin site has been tender with gentle palpation since arrival from [facility]. Pt voided an unmeasured amount.

## 2025-05-14 NOTE — ANESTHESIA POSTPROCEDURE EVALUATION
Patient: Mary Jackson    Procedure Summary       Date: 05/14/25 Room / Location: [facility] / [facility]    Anesthesia Start: 1028 Anesthesia Stop:     Procedure: EP Study Complete Diagnosis:       Paroxysmal SVT (supraventricular tachycardia)      Palpitations      Inappropriate sinus tachycardia    Providers: Mireille Montiel MD Responsible Provider: Ralph Macias MD    Anesthesia Type: MAC ASA Status: 2            Anesthesia Type: MAC    Vitals Value Taken Time   /93 05/14/25 13:15   Temp 36 05/14/25 13:15   Pulse 115 05/14/25 13:15   Resp 22 05/14/25 13:15   SpO2 100 05/14/25 13:15       Anesthesia Post Evaluation    Patient location during evaluation: bedside  Patient participation: complete - patient participated  Level of consciousness: awake and alert  Pain score: 0  Pain management: adequate  Airway patency: patent  Cardiovascular status: acceptable and stable  Respiratory status: acceptable and room air  Hydration status: acceptable  Postoperative Nausea and Vomiting: none        No notable events documented.

## 2025-05-14 NOTE — DISCHARGE INSTRUCTIONS
Discharge instructions after an electrophysiology study / ablation procedure-READ INSTRUCTIONS COMPLETELY, CALL DR RM WITH ANY QUESTIONS OR CONCERNS.    After a procedure using sedation  You should return home and rest for the remainder of the day and evening.   It is recommended a responsible adult be with you for the first 24 hours after the procedure.  Do not make any legal decisions for 24 hours after your procedure.  Do not drink alcoholic beverages for 24 hours after your procedure.    **Call 911 or seek medical attention for:  Severe chest pain  Change in mental status or weakness in extremities.  Dizziness, light headedness, or feeling faint.  If there is a large amount of bleeding or spurting of blood.  DO NOT drive yourself to the hospital.    Activity/ After care  Avoid heavy lifting (over 10 pounds), strenuous activity/exercise, squatting or excessive bending for 7 days.  Avoid sexual activity for 3 days and until any groin discomfort has ceased.  No driving for 48 hours after procedure.  You may experience slight chest discomfort post procedurally due to inflammation in the heart from the ablation    Groin site care  The transparent dressing should be removed from the site 24 hours after the procedure.    It is ok to shower after transparent dressing is removed  Wash the site gently with soap and water.   Rinse well and pat dry.  You may apply a Band-Aid to the site.   Avoid lotions, ointments, or powders until fully healed.  No submerged bathing, swimming, or hot tubs for the next 7 days, or until fully healed.  You may take acetaminophen (Tylenol) as directed for discomfort.      Notify your provider  If you develop a large area of bruising or a large lump.  It is normal to notice a small bruise around the puncture site and/or a small lump.   If bleeding should occur, lay down and apply pressure to the affected area for 15 minutes.    If groin pain is not relieved with acetaminophen (Tylenol).  If  you develop tingling, numbness, pain, or coolness in the leg/arm beyond the site where the procedure was performed.  If post procedure chest discomfort unrelieved with acetaminophen (Tylenol)    Follow up appointments  You will be scheduled for a follow up appointment with your provider in 3-4 months.  A heart monitor may be ordered prior to your provider appointment if indicated.  Any necessary appointments will be scheduled and appear on your After Visit Summary.

## 2025-05-14 NOTE — ANESTHESIA PREPROCEDURE EVALUATION
Patient: Mary Jackson    Procedure Information       Date/Time: 05/14/25 1100    Procedure: EP Study Complete - possible ablation  Echo on admit    Location: [facility]    Providers: Mireille Montiel MD            Relevant Problems   Cardiac   (+) Abnormal electrocardiography   (+) Hypertriglyceridemia   (+) Inappropriate sinus tachycardia   (+) PAC (premature atrial contraction)   (+) Paroxysmal SVT (supraventricular tachycardia)   (+) Paroxysmal supraventricular tachycardia   (+) Supraventricular tachycardia by ECG      Neuro   (+) Anxiety and depression      Endocrine   (+) Graves' disease in remission   (+) Hyperthyroidism   (+) Thyroid goiter      Hematology   (+) Anemia      ID   (+) HPV in female       Clinical information reviewed:   Tobacco  Allergies  Meds   Med Hx  Surg Hx   Fam Hx  Soc Hx        NPO Detail:  NPO/Void Status  Date of Last Liquid: 05/13/25  Time of Last Liquid: 2200  Date of Last Solid: 05/13/25  Time of Last Solid: 2200         Physical Exam    Airway  Mallampati: II     Cardiovascular - normal exam   Dental - normal exam     Pulmonary - normal exam   Abdominal - normal examAbdomen: soft             Anesthesia Plan    History of general anesthesia?: yes  History of complications of general anesthesia?: no    ASA 2     MAC     intravenous induction   Anesthetic plan and risks discussed with patient.    Plan discussed with CRNA.

## 2025-05-14 NOTE — INTERVAL H&P NOTE
H&P reviewed. The patient was examined and there are no changes to the H&P.  In addition,  She had 20 second episode of tachycardia earlier this week.    Lab Results   Component Value Date    WBC 4.5 05/08/2025    HGB 13.4 05/08/2025    HCT 38.5 05/08/2025    MCV 85 05/08/2025     05/08/2025      Lab Results   Component Value Date    GLUCOSE 84 05/14/2025    CALCIUM 8.7 05/14/2025     05/14/2025    K 4.0 05/14/2025    CO2 22 05/14/2025     05/14/2025    BUN 10 05/14/2025    CREATININE 0.70 05/14/2025      Lab Results   Component Value Date    INR 1.0 05/08/2025    INR 1.0 06/12/2023    INR 1.0 05/21/2023    PROTIME 10.9 05/08/2025    PROTIME 11.5 06/12/2023    PROTIME 11.5 05/21/2023      Counseling greater than 50% of the visit performed.  The patient and I discussed preoperative cardiac evaluation, types of tachycardia, possible no inducible tachycardia, follow-up, and management.  All questions answered.  Patient appreciative care

## 2025-05-14 NOTE — NURSING NOTE
Purewick reapplied after pt stated she had to urinate as was unable previously with it and it was removed by another RN at pt request. Pt preferred not to use internal catheter as was previously mentioned.

## 2025-05-14 NOTE — NURSING NOTE
Pt voided 500cc clear yellow urine via purewick, states relief of bladder pressure.  Removed per pt requested.

## 2025-05-16 LAB
ATRIAL RATE: 81 BPM
ATRIAL RATE: 83 BPM
P AXIS: 27 DEGREES
P AXIS: 42 DEGREES
P OFFSET: 203 MS
P OFFSET: 204 MS
P ONSET: 155 MS
P ONSET: 157 MS
PR INTERVAL: 108 MS
PR INTERVAL: 110 MS
Q ONSET: 210 MS
Q ONSET: 211 MS
QRS COUNT: 13 BEATS
QRS COUNT: 14 BEATS
QRS DURATION: 92 MS
QRS DURATION: 98 MS
QT INTERVAL: 394 MS
QT INTERVAL: 410 MS
QTC CALCULATION(BAZETT): 462 MS
QTC CALCULATION(BAZETT): 476 MS
QTC FREDERICIA: 439 MS
QTC FREDERICIA: 453 MS
R AXIS: 51 DEGREES
R AXIS: 69 DEGREES
T AXIS: 12 DEGREES
T AXIS: 30 DEGREES
T OFFSET: 408 MS
T OFFSET: 415 MS
VENTRICULAR RATE: 81 BPM
VENTRICULAR RATE: 83 BPM

## 2025-05-16 PROCEDURE — 99285 EMERGENCY DEPT VISIT HI MDM: CPT | Performed by: STUDENT IN AN ORGANIZED HEALTH CARE EDUCATION/TRAINING PROGRAM

## 2025-05-16 ASSESSMENT — PAIN SCALES - GENERAL: PAINLEVEL_OUTOF10: 7

## 2025-05-16 ASSESSMENT — PAIN DESCRIPTION - PAIN TYPE: TYPE: ACUTE PAIN

## 2025-05-16 ASSESSMENT — PAIN DESCRIPTION - ORIENTATION: ORIENTATION: LEFT

## 2025-05-16 ASSESSMENT — PAIN DESCRIPTION - DESCRIPTORS: DESCRIPTORS: SHARP

## 2025-05-16 ASSESSMENT — LIFESTYLE VARIABLES
EVER HAD A DRINK FIRST THING IN THE MORNING TO STEADY YOUR NERVES TO GET RID OF A HANGOVER: NO
HAVE YOU EVER FELT YOU SHOULD CUT DOWN ON YOUR DRINKING: NO
TOTAL SCORE: 0
HAVE PEOPLE ANNOYED YOU BY CRITICIZING YOUR DRINKING: NO
EVER FELT BAD OR GUILTY ABOUT YOUR DRINKING: NO

## 2025-05-16 ASSESSMENT — PAIN - FUNCTIONAL ASSESSMENT: PAIN_FUNCTIONAL_ASSESSMENT: 0-10

## 2025-05-16 ASSESSMENT — PAIN DESCRIPTION - LOCATION: LOCATION: CHEST

## 2025-05-17 ENCOUNTER — APPOINTMENT (OUTPATIENT)
Dept: CARDIOLOGY | Facility: HOSPITAL | Age: 30
End: 2025-05-17
Payer: COMMERCIAL

## 2025-05-17 ENCOUNTER — HOSPITAL ENCOUNTER (EMERGENCY)
Facility: HOSPITAL | Age: 30
Discharge: HOME | End: 2025-05-17
Attending: STUDENT IN AN ORGANIZED HEALTH CARE EDUCATION/TRAINING PROGRAM
Payer: COMMERCIAL

## 2025-05-17 ENCOUNTER — APPOINTMENT (OUTPATIENT)
Dept: RADIOLOGY | Facility: HOSPITAL | Age: 30
End: 2025-05-17
Payer: COMMERCIAL

## 2025-05-17 VITALS
OXYGEN SATURATION: 98 % | SYSTOLIC BLOOD PRESSURE: 135 MMHG | BODY MASS INDEX: 28.79 KG/M2 | HEART RATE: 74 BPM | RESPIRATION RATE: 17 BRPM | HEIGHT: 68 IN | WEIGHT: 190 LBS | DIASTOLIC BLOOD PRESSURE: 84 MMHG | TEMPERATURE: 97.7 F

## 2025-05-17 DIAGNOSIS — R07.9 CHEST PAIN, UNSPECIFIED TYPE: Primary | ICD-10-CM

## 2025-05-17 LAB
ALBUMIN SERPL BCP-MCNC: 3.9 G/DL (ref 3.4–5)
ALP SERPL-CCNC: 52 U/L (ref 33–110)
ALT SERPL W P-5'-P-CCNC: 19 U/L (ref 7–45)
ANION GAP SERPL CALC-SCNC: 13 MMOL/L (ref 10–20)
AST SERPL W P-5'-P-CCNC: 19 U/L (ref 9–39)
BASOPHILS # BLD AUTO: 0.06 X10*3/UL (ref 0–0.1)
BASOPHILS NFR BLD AUTO: 0.7 %
BILIRUB SERPL-MCNC: 0.3 MG/DL (ref 0–1.2)
BUN SERPL-MCNC: 12 MG/DL (ref 6–23)
CALCIUM SERPL-MCNC: 8.5 MG/DL (ref 8.6–10.3)
CARDIAC TROPONIN I PNL SERPL HS: 12 NG/L (ref 0–13)
CARDIAC TROPONIN I PNL SERPL HS: 12 NG/L (ref 0–13)
CHLORIDE SERPL-SCNC: 105 MMOL/L (ref 98–107)
CO2 SERPL-SCNC: 23 MMOL/L (ref 21–32)
CREAT SERPL-MCNC: 0.79 MG/DL (ref 0.5–1.05)
EGFRCR SERPLBLD CKD-EPI 2021: >90 ML/MIN/1.73M*2
EOSINOPHIL # BLD AUTO: 0.33 X10*3/UL (ref 0–0.7)
EOSINOPHIL NFR BLD AUTO: 3.9 %
ERYTHROCYTE [DISTWIDTH] IN BLOOD BY AUTOMATED COUNT: 11.6 % (ref 11.5–14.5)
GLUCOSE SERPL-MCNC: 117 MG/DL (ref 74–99)
HCT VFR BLD AUTO: 37.7 % (ref 36–46)
HGB BLD-MCNC: 12.9 G/DL (ref 12–16)
IMM GRANULOCYTES # BLD AUTO: 0.02 X10*3/UL (ref 0–0.7)
IMM GRANULOCYTES NFR BLD AUTO: 0.2 % (ref 0–0.9)
LYMPHOCYTES # BLD AUTO: 3.04 X10*3/UL (ref 1.2–4.8)
LYMPHOCYTES NFR BLD AUTO: 36.3 %
MAGNESIUM SERPL-MCNC: 2 MG/DL (ref 1.6–2.4)
MCH RBC QN AUTO: 29.3 PG (ref 26–34)
MCHC RBC AUTO-ENTMCNC: 34.2 G/DL (ref 32–36)
MCV RBC AUTO: 86 FL (ref 80–100)
MONOCYTES # BLD AUTO: 0.47 X10*3/UL (ref 0.1–1)
MONOCYTES NFR BLD AUTO: 5.6 %
NEUTROPHILS # BLD AUTO: 4.45 X10*3/UL (ref 1.2–7.7)
NEUTROPHILS NFR BLD AUTO: 53.3 %
NRBC BLD-RTO: 0 /100 WBCS (ref 0–0)
PLATELET # BLD AUTO: 234 X10*3/UL (ref 150–450)
POTASSIUM SERPL-SCNC: 3.6 MMOL/L (ref 3.5–5.3)
PROT SERPL-MCNC: 6.6 G/DL (ref 6.4–8.2)
RBC # BLD AUTO: 4.4 X10*6/UL (ref 4–5.2)
SODIUM SERPL-SCNC: 137 MMOL/L (ref 136–145)
WBC # BLD AUTO: 8.4 X10*3/UL (ref 4.4–11.3)

## 2025-05-17 PROCEDURE — 36415 COLL VENOUS BLD VENIPUNCTURE: CPT | Performed by: NURSE PRACTITIONER

## 2025-05-17 PROCEDURE — 85025 COMPLETE CBC W/AUTO DIFF WBC: CPT | Performed by: NURSE PRACTITIONER

## 2025-05-17 PROCEDURE — 83735 ASSAY OF MAGNESIUM: CPT | Performed by: NURSE PRACTITIONER

## 2025-05-17 PROCEDURE — 84484 ASSAY OF TROPONIN QUANT: CPT | Performed by: NURSE PRACTITIONER

## 2025-05-17 PROCEDURE — 71045 X-RAY EXAM CHEST 1 VIEW: CPT

## 2025-05-17 PROCEDURE — 71045 X-RAY EXAM CHEST 1 VIEW: CPT | Performed by: RADIOLOGY

## 2025-05-17 PROCEDURE — 84075 ASSAY ALKALINE PHOSPHATASE: CPT | Performed by: NURSE PRACTITIONER

## 2025-05-17 PROCEDURE — 93005 ELECTROCARDIOGRAM TRACING: CPT

## 2025-05-17 ASSESSMENT — HEART SCORE
TROPONIN: LESS THAN OR EQUAL TO NORMAL LIMIT
RISK FACTORS: 1-2 RISK FACTORS
ECG: NORMAL
HISTORY: SLIGHTLY SUSPICIOUS
AGE: <45
HEART SCORE: 1

## 2025-05-17 NOTE — ED PROVIDER NOTES
HPI   Chief Complaint   Patient presents with    Chest Pain     Ep and cardiac ablasion for hx svt. Pt states left sided chest wall pain started  few hours a go           Patient is a healthy nontoxic-appearing 29-year-old female with past medical history of anemia, Graves' disease in remission, thyroid goiter, iron deficiency, premature atrial contraction, palpitations, paroxysmal SVT, anxiety and depression, serum iron raised, presents the emergency room today for complaint of chest discomfort.  Patient states for the past 3 hours prior to arrival patient has been experiencing midsternal chest pain that radiates to the left arm.  Patient denies any injuries trauma or falls, lightheadedness, dizziness, syncopal near syncopal events, palpitations, shortness of breath difficulty breathing.  Patient states he did have cardiac ablation done 2 days ago for paroxysmal SVT and wanted make sure she was not experiencing any complications or recurrent SVT.  Patient denies any abdominal pain, nausea, ongoing diarrhea or constipation, fever, shaking, or chills.              Patient History   Medical History[1]  Surgical History[2]  Family History[3]  Social History[4]    Physical Exam   ED Triage Vitals [05/16/25 2333]   Temperature Heart Rate Respirations BP   36.5 °C (97.7 °F) 89 17 (!) 166/101      Pulse Ox Temp Source Heart Rate Source Patient Position   99 % Temporal Monitor --      BP Location FiO2 (%)     -- --       Physical Exam  Vitals and nursing note reviewed. Exam conducted with a chaperone present.   Constitutional:       General: She is not in acute distress.     Appearance: Normal appearance. She is not ill-appearing, toxic-appearing or diaphoretic.      Interventions: She is not intubated.  HENT:      Head: Normocephalic.      Nose: Nose normal.      Mouth/Throat:      Mouth: Mucous membranes are moist.      Pharynx: No oropharyngeal exudate or posterior oropharyngeal erythema.   Eyes:      General:          Right eye: No discharge.         Left eye: No discharge.      Extraocular Movements: Extraocular movements intact.      Pupils: Pupils are equal, round, and reactive to light.   Neck:      Vascular: No carotid bruit.   Cardiovascular:      Rate and Rhythm: Normal rate and regular rhythm.      Pulses: Normal pulses. No decreased pulses.      Heart sounds: Normal heart sounds. Heart sounds not distant. No murmur heard.     No friction rub. No gallop.   Pulmonary:      Effort: Pulmonary effort is normal. No tachypnea, bradypnea, accessory muscle usage, prolonged expiration, respiratory distress or retractions. She is not intubated.      Breath sounds: Normal breath sounds. No stridor, decreased air movement or transmitted upper airway sounds. No decreased breath sounds, wheezing, rhonchi or rales.   Chest:      Chest wall: No tenderness.   Abdominal:      General: Abdomen is flat. Bowel sounds are normal.      Palpations: Abdomen is soft.   Musculoskeletal:         General: Normal range of motion.      Cervical back: Normal range of motion and neck supple. No rigidity or tenderness.   Lymphadenopathy:      Cervical: No cervical adenopathy.   Skin:     General: Skin is warm and dry.      Capillary Refill: Capillary refill takes less than 2 seconds.   Neurological:      General: No focal deficit present.      Mental Status: She is alert and oriented to person, place, and time.           ED Course & MDM   ED Course as of 05/17/25 0236   Sat May 17, 2025   0058 EKG interpreted by myself reveals sinus you have a rate of 77 bpm with no ST elevation, there is T wave inversion however in lead V1 to lead a similar comparison to previous EKG.  MD interval 104, QRS of 90 and QTc of 477 [EC]   0123 Troponin I, High Sensitivity: 12 [EC]   0157 Chest x-ray reveals  IMPRESSION:  No acute cardiopulmonary process.   [EC]   0228 Troponin I, High Sensitivity: 12 [EC]      ED Course User Index  [EC] Sarmad Bowens, APRN-CNP          Diagnoses as of 05/17/25 0236   Chest pain, unspecified type                 No data recorded     Caleb Coma Scale Score: 15 (05/16/25 2335 : Luna Marshall RN) HEART Score: 1 (05/17/25 0233 : DALLIA Caldwell)                         Medical Decision Making  Given patient's complaint presentation a thorough exam was performed.  Patient remains hemodynamically stable during emergency evaluation, is afebrile, no adventitious lung sounds auscultated, speaking clearly no distress, cardiac sounds auscultated are regular, I have a low suspicion for ACS, pulmonary embolism, aortic, aneurysm, postsurgical complications.  Lab work was ordered include EKG and chest x-ray.  Chest x-ray as interpreted by radiologist reveals no acute cardiopulmonary process, lab work revealed several irregularities without any critical lab values, initial troponin Veals value of 12, repeat troponin also reveals a value of 12.  Patient has a heart score of 1 and I do not believe any further cardiac stratification is warranted at this time.  Reevaluation of patient reveals improvement states she feels much better and requesting discharge home.  I encouraged patient monitor symptoms, if they become worse return to emergency room for further evaluation, otherwise follow-up with primary care provider or cardiologist in the next several weeks.  Patient was agreeable this plan and discharged home in stable condition.    DALILA Caldwell     Portions of this note were generated using digital voice recognition software, and may contain grammatical errors  .    Procedure  Procedures       [1]   Past Medical History:  Diagnosis Date    12 weeks gestation of pregnancy ([facility]) 12/20/2019    12 weeks gestation of pregnancy    Abnormal Pap smear of cervix     Acute upper respiratory infection, unspecified 10/16/2019    Viral upper respiratory tract infection    Anemia     Anxiety     Arrhythmia     Chronic sinusitis, unspecified 03/16/2020     Other sinusitis    Depression     Disease of thyroid gland     Encounter for immunization 11/25/2020    Encounter for vaccination    Encounter for immunization 01/06/2021    Encounter for vaccination    Encounter for supervision of normal pregnancy, unspecified, unspecified trimester 07/10/2020    Prenatal care    Endocrine, nutritional and metabolic diseases complicating pregnancy, unspecified trimester ([facility]) 06/18/2020    Thyroid disease in pregnancy    HPV (human papilloma virus) infection     Hyperthyroidism     Other chest pain 10/02/2018    Atypical chest pain    Other conditions influencing health status 04/19/2019    History of pregnancy    Personal history of other diseases of the respiratory system 03/09/2020    History of sore throat    Personal history of other diseases of the respiratory system 10/16/2019    History of sore throat    Personal history of other infectious and parasitic diseases 03/09/2020    History of viral infection    Personal history of other specified conditions 04/01/2022    History of palpitations    Personal history of other specified conditions 03/19/2019    History of palpitations    Personal history of other specified conditions 05/03/2021    History of chest pain    Sacrococcygeal disorders, not elsewhere classified 05/10/2019    Tail bone pain   [2]   Past Surgical History:  Procedure Laterality Date    ABLATION OF DYSRHYTHMIC FOCUS      CARDIAC ELECTROPHYSIOLOGY PROCEDURE N/A 5/14/2025    Procedure: EP Study Complete;  Surgeon: Mireille Montiel MD;  Location: [facility];  Service: Electrophysiology;  Laterality: N/A;  possible ablation  Echo on admit    COLPOSCOPY      HERNIA REPAIR  07/27/2018    Hernia Repair    OTHER SURGICAL HISTORY  09/24/2021    Catheter ablation    OTHER SURGICAL HISTORY  05/03/2021    Eye surgery    OTHER SURGICAL HISTORY  05/03/2021    Hernia repair    OTHER SURGICAL HISTORY  05/03/2021    Tonsillectomy    TONSILLECTOMY  07/27/2018     Tonsillectomy   [3]   Family History  Problem Relation Name Age of Onset    Anxiety disorder Mother      Thyroid disease Other Paternal GGM     Thyroid disease Other Cousin     Thyroid disease Paternal Grandmother Marianne     Asthma Brother Bj     Alcohol abuse Father Doe     Diabetes Maternal Grandfather Don    [4]   Social History  Tobacco Use    Smoking status: Former     Types: Cigarettes    Smokeless tobacco: Never   Vaping Use    Vaping status: Never Used   Substance Use Topics    Alcohol use: Yes     Comment: occasional    Drug use: Not Currently     Types: Marijuana        JEANIE Caldwell-SHILO  05/17/25 0236

## 2025-05-18 LAB
ATRIAL RATE: 77 BPM
P AXIS: 29 DEGREES
P OFFSET: 202 MS
P ONSET: 157 MS
PR INTERVAL: 104 MS
Q ONSET: 209 MS
QRS COUNT: 13 BEATS
QRS DURATION: 90 MS
QT INTERVAL: 422 MS
QTC CALCULATION(BAZETT): 477 MS
QTC FREDERICIA: 458 MS
R AXIS: 56 DEGREES
T AXIS: 13 DEGREES
T OFFSET: 420 MS
VENTRICULAR RATE: 77 BPM

## 2025-05-20 ENCOUNTER — TELEPHONE (OUTPATIENT)
Dept: CARDIOLOGY | Facility: CLINIC | Age: 30
End: 2025-05-20
Payer: COMMERCIAL

## 2025-05-20 NOTE — TELEPHONE ENCOUNTER
Dr. Montiel signed [employer] forms.  Faxed back along with ablation procedure note and last OV note.  Successful fax confirmation recv'd, placed to be scanned to chart.  Jaylyn   
hard copy, drawn during this pregnancy

## 2025-05-30 ENCOUNTER — PATIENT MESSAGE (OUTPATIENT)
Dept: CARDIOLOGY | Facility: CLINIC | Age: 30
End: 2025-05-30
Payer: COMMERCIAL

## 2025-07-11 ENCOUNTER — APPOINTMENT (OUTPATIENT)
Dept: PRIMARY CARE | Facility: CLINIC | Age: 30
End: 2025-07-11
Payer: COMMERCIAL

## 2025-07-15 ENCOUNTER — APPOINTMENT (OUTPATIENT)
Dept: CARDIOLOGY | Facility: CLINIC | Age: 30
End: 2025-07-15
Payer: COMMERCIAL

## 2025-07-15 DIAGNOSIS — I47.10 PAROXYSMAL SUPRAVENTRICULAR TACHYCARDIA: ICD-10-CM

## 2025-07-18 PROCEDURE — 93227 XTRNL ECG REC<48 HR R&I: CPT | Performed by: INTERNAL MEDICINE

## 2025-07-22 ASSESSMENT — PROMIS GLOBAL HEALTH SCALE
RATE_SOCIAL_SATISFACTION: FAIR
RATE_GENERAL_HEALTH: FAIR
CARRYOUT_PHYSICAL_ACTIVITIES: COMPLETELY
RATE_QUALITY_OF_LIFE: FAIR
RATE_AVERAGE_PAIN: 0
RATE_MENTAL_HEALTH: FAIR
EMOTIONAL_PROBLEMS: SOMETIMES
RATE_PHYSICAL_HEALTH: FAIR
CARRYOUT_SOCIAL_ACTIVITIES: GOOD

## 2025-07-28 ENCOUNTER — APPOINTMENT (OUTPATIENT)
Dept: PRIMARY CARE | Facility: CLINIC | Age: 30
End: 2025-07-28
Payer: COMMERCIAL

## 2025-07-29 ENCOUNTER — APPOINTMENT (OUTPATIENT)
Dept: PRIMARY CARE | Facility: CLINIC | Age: 30
End: 2025-07-29
Payer: COMMERCIAL

## 2025-07-29 VITALS
DIASTOLIC BLOOD PRESSURE: 92 MMHG | TEMPERATURE: 97.3 F | HEIGHT: 68 IN | WEIGHT: 181 LBS | OXYGEN SATURATION: 97 % | BODY MASS INDEX: 27.43 KG/M2 | HEART RATE: 90 BPM | SYSTOLIC BLOOD PRESSURE: 138 MMHG

## 2025-07-29 DIAGNOSIS — Z00.00 WELL ADULT EXAM: Primary | ICD-10-CM

## 2025-07-29 DIAGNOSIS — F41.9 ANXIETY AND DEPRESSION: ICD-10-CM

## 2025-07-29 DIAGNOSIS — I10 ESSENTIAL (PRIMARY) HYPERTENSION: ICD-10-CM

## 2025-07-29 DIAGNOSIS — F32.A ANXIETY AND DEPRESSION: ICD-10-CM

## 2025-07-29 PROCEDURE — 3075F SYST BP GE 130 - 139MM HG: CPT | Performed by: INTERNAL MEDICINE

## 2025-07-29 PROCEDURE — 99395 PREV VISIT EST AGE 18-39: CPT | Performed by: INTERNAL MEDICINE

## 2025-07-29 PROCEDURE — 3008F BODY MASS INDEX DOCD: CPT | Performed by: INTERNAL MEDICINE

## 2025-07-29 PROCEDURE — G8433 SCR FOR DEP NOT CPT DOC RSN: HCPCS | Performed by: INTERNAL MEDICINE

## 2025-07-29 PROCEDURE — 3080F DIAST BP >= 90 MM HG: CPT | Performed by: INTERNAL MEDICINE

## 2025-07-29 RX ORDER — HYDROCHLOROTHIAZIDE 12.5 MG/1
12.5 TABLET ORAL DAILY
Qty: 30 TABLET | Refills: 11 | Status: SHIPPED | OUTPATIENT
Start: 2025-07-29 | End: 2025-07-30 | Stop reason: ALTCHOICE

## 2025-07-29 NOTE — PROGRESS NOTES
"Subjective       Current Issues:  Current concerns include none  Sees dr granda  .bp a little high  W work has checked  No meds for graves disease followed  Sees counselor  Still depression  No si  On wellbutrin little help     Sleep: all night  No bowel or bladder issues  No cp or sob or depression  Lives in condo w son  Review of Nutrition:  Current diet: discussed    Exercise discussed    Gen:  no fever  HEENT:  no trouble swallowing  CV:  no dyspnea, cyanosis  Lungs:  no shortness of breath  GI:  no constipation, no blood in stool  Vascular:  no edema  Neuro:   no weakness  Skin:  no rash  MS:no joint swelling  Gu:  no urinary complaints  All other systems have been reviewed and are negative for complaint         Screening Questions:  Objective   BP (!) 138/92   Pulse 90   Temp 36.3 °C (97.3 °F)   Ht 1.727 m (5' 8\")   Wt 82.1 kg (181 lb)   SpO2 97%   BMI 27.52 kg/m²       General:   alert and oriented, in no acute distress   Gait:   normal   Skin:   normal   Oral cavity:   lips, mucosa, and tongue normal; teeth and gums normal   Eyes:   sclerae white, pupils equal and reactive   Ears:   normal bilaterally Tms grey   Neck:   no adenopathy and thyroid not enlarged, symmetric, no tenderness/mass/nodules   Lungs:  clear to auscultation bilaterally   Heart:   regular rate and rhythm, S1, S2 normal, no murmur, click, rub or gallop   Abdomen:  soft, non-tender; bowel sounds normal; no masses, no organomegaly   : ne       Extremities:  extremities normal, warm and well-perfused; no cyanosis, clubbing, or edema,   Neuro:  normal without focal findings and muscle tone and strength normal and symmetric        Mary was seen today for annual exam.  Diagnoses and all orders for this visit:  Well adult exam (Primary)  Essential (primary) hypertension  -     Discontinue: hydroCHLOROthiazide (Microzide) 12.5 mg tablet; Take 1 tablet (12.5 mg) by mouth once daily.  -     Hemoglobin A1C; Future  -     Basic Metabolic " Panel; Future  -     Lipid Panel; Future  -     CBC and Auto Differential; Future  -     Hemoglobin A1C  -     Basic Metabolic Panel  -     Lipid Panel  -     CBC and Auto Differential  Anxiety and depression  Comments:  will discuss adelina w dr Montiel      Chronic conditions reviewed in the assessment and plan.    Continue medications unless specified otherwise.  Previous labs reviewed.   Other specialty provider notes reviewed   Follow up in one month or prn

## 2025-07-30 ENCOUNTER — TELEPHONE (OUTPATIENT)
Dept: PRIMARY CARE | Facility: CLINIC | Age: 30
End: 2025-07-30
Payer: COMMERCIAL

## 2025-07-30 DIAGNOSIS — I47.10 PAROXYSMAL SVT (SUPRAVENTRICULAR TACHYCARDIA): Primary | ICD-10-CM

## 2025-07-30 DIAGNOSIS — I10 ESSENTIAL (PRIMARY) HYPERTENSION: ICD-10-CM

## 2025-07-30 DIAGNOSIS — F32.A DEPRESSION, UNSPECIFIED DEPRESSION TYPE: ICD-10-CM

## 2025-07-30 RX ORDER — VENLAFAXINE HYDROCHLORIDE 37.5 MG/1
37.5 CAPSULE, EXTENDED RELEASE ORAL DAILY
Qty: 30 CAPSULE | Refills: 1 | Status: SHIPPED | OUTPATIENT
Start: 2025-07-30 | End: 2025-09-28

## 2025-07-30 RX ORDER — DILTIAZEM HYDROCHLORIDE EXTENDED-RELEASE TABLETS 360 MG/1
360 TABLET, EXTENDED RELEASE ORAL DAILY
Qty: 30 TABLET | Refills: 11 | Status: SHIPPED | OUTPATIENT
Start: 2025-07-30 | End: 2026-07-30

## 2025-07-30 NOTE — TELEPHONE ENCOUNTER
Let her know dr heather ok w effexor, I willl send in  She prefers we don't do hydrochlorothiazide due to possible low potassium  So I will send in higher dose of cardizem.  Let her know this.   Set up fu appt in a month

## 2025-07-30 NOTE — TELEPHONE ENCOUNTER
Relayed to patient verbally understood and acknowledged   Clerical please assist in scheduling appointment

## 2025-08-12 ENCOUNTER — APPOINTMENT (OUTPATIENT)
Dept: CARDIOLOGY | Facility: CLINIC | Age: 30
End: 2025-08-12
Payer: COMMERCIAL

## 2025-08-12 VITALS
DIASTOLIC BLOOD PRESSURE: 72 MMHG | WEIGHT: 180 LBS | SYSTOLIC BLOOD PRESSURE: 110 MMHG | HEART RATE: 96 BPM | BODY MASS INDEX: 27.28 KG/M2 | HEIGHT: 68 IN

## 2025-08-12 DIAGNOSIS — Z71.89 ENCOUNTER TO DISCUSS TREATMENT OPTIONS: ICD-10-CM

## 2025-08-12 DIAGNOSIS — Z71.89 ENCOUNTER FOR MEDICATION REVIEW AND COUNSELING: ICD-10-CM

## 2025-08-12 DIAGNOSIS — I47.10 PAROXYSMAL SVT (SUPRAVENTRICULAR TACHYCARDIA): Primary | ICD-10-CM

## 2025-08-12 DIAGNOSIS — I47.11 INAPPROPRIATE SINUS TACHYCARDIA: ICD-10-CM

## 2025-08-12 DIAGNOSIS — R00.2 PALPITATIONS: ICD-10-CM

## 2025-08-12 DIAGNOSIS — Z87.891 FORMER SMOKER: ICD-10-CM

## 2025-08-12 DIAGNOSIS — I49.1 PAC (PREMATURE ATRIAL CONTRACTION): ICD-10-CM

## 2025-08-12 PROCEDURE — 99214 OFFICE O/P EST MOD 30 MIN: CPT | Performed by: INTERNAL MEDICINE

## 2025-08-12 PROCEDURE — 3008F BODY MASS INDEX DOCD: CPT | Performed by: INTERNAL MEDICINE

## 2025-08-12 ASSESSMENT — ENCOUNTER SYMPTOMS
DYSPNEA ON EXERTION: 0
PALPITATIONS: 0

## 2025-08-28 ENCOUNTER — APPOINTMENT (OUTPATIENT)
Dept: CARDIOLOGY | Facility: HOSPITAL | Age: 30
End: 2025-08-28
Payer: COMMERCIAL

## 2025-08-28 ENCOUNTER — APPOINTMENT (OUTPATIENT)
Dept: RADIOLOGY | Facility: HOSPITAL | Age: 30
End: 2025-08-28
Payer: COMMERCIAL

## 2025-08-28 ENCOUNTER — APPOINTMENT (OUTPATIENT)
Dept: PRIMARY CARE | Facility: CLINIC | Age: 30
End: 2025-08-28
Payer: COMMERCIAL

## 2025-08-28 ENCOUNTER — HOSPITAL ENCOUNTER (EMERGENCY)
Facility: HOSPITAL | Age: 30
Discharge: HOME | End: 2025-08-28
Attending: EMERGENCY MEDICINE
Payer: COMMERCIAL

## 2025-08-28 VITALS
HEART RATE: 83 BPM | BODY MASS INDEX: 26.37 KG/M2 | TEMPERATURE: 97.8 F | SYSTOLIC BLOOD PRESSURE: 150 MMHG | WEIGHT: 174 LBS | OXYGEN SATURATION: 98 % | HEIGHT: 68 IN | DIASTOLIC BLOOD PRESSURE: 100 MMHG

## 2025-08-28 DIAGNOSIS — R25.1 TREMOR: ICD-10-CM

## 2025-08-28 DIAGNOSIS — R07.9 CHEST PAIN, UNSPECIFIED TYPE: Primary | ICD-10-CM

## 2025-08-28 LAB
ANION GAP SERPL CALCULATED.4IONS-SCNC: 9 MMOL/L (CALC) (ref 7–17)
BASOPHILS # BLD AUTO: 28 CELLS/UL (ref 0–200)
BASOPHILS NFR BLD AUTO: 0.5 %
BUN SERPL-MCNC: 11 MG/DL (ref 7–25)
BUN/CREAT SERPL: ABNORMAL (CALC) (ref 6–22)
CALCIUM SERPL-MCNC: 9.1 MG/DL (ref 8.6–10.2)
CHLORIDE SERPL-SCNC: 105 MMOL/L (ref 98–110)
CO2 SERPL-SCNC: 26 MMOL/L (ref 20–32)
CREAT SERPL-MCNC: 0.88 MG/DL (ref 0.5–0.97)
EGFRCR SERPLBLD CKD-EPI 2021: 91 ML/MIN/1.73M2
EOSINOPHIL # BLD AUTO: 182 CELLS/UL (ref 15–500)
EOSINOPHIL NFR BLD AUTO: 3.3 %
ERYTHROCYTE [DISTWIDTH] IN BLOOD BY AUTOMATED COUNT: 11.8 % (ref 11–15)
EST. AVERAGE GLUCOSE BLD GHB EST-MCNC: 103 MG/DL
EST. AVERAGE GLUCOSE BLD GHB EST-SCNC: 5.7 MMOL/L
GLUCOSE SERPL-MCNC: 116 MG/DL (ref 65–99)
HBA1C MFR BLD: 5.2 %
HCT VFR BLD AUTO: 39.9 % (ref 35–45)
HGB BLD-MCNC: 13.2 G/DL (ref 11.7–15.5)
LYMPHOCYTES # BLD AUTO: 1909 CELLS/UL (ref 850–3900)
LYMPHOCYTES NFR BLD AUTO: 34.7 %
MCH RBC QN AUTO: 29.7 PG (ref 27–33)
MCHC RBC AUTO-ENTMCNC: 33.1 G/DL (ref 32–36)
MCV RBC AUTO: 89.7 FL (ref 80–100)
MONOCYTES # BLD AUTO: 424 CELLS/UL (ref 200–950)
MONOCYTES NFR BLD AUTO: 7.7 %
NEUTROPHILS # BLD AUTO: 2959 CELLS/UL (ref 1500–7800)
NEUTROPHILS NFR BLD AUTO: 53.8 %
PLATELET # BLD AUTO: 298 THOUSAND/UL (ref 140–400)
PMV BLD REES-ECKER: 9.9 FL (ref 7.5–12.5)
POC FINGERSTICK BLOOD GLUCOSE: 82 MG/DL (ref 70–100)
POTASSIUM SERPL-SCNC: 4 MMOL/L (ref 3.5–5.3)
RBC # BLD AUTO: 4.45 MILLION/UL (ref 3.8–5.1)
SODIUM SERPL-SCNC: 140 MMOL/L (ref 135–146)
WBC # BLD AUTO: 5.5 THOUSAND/UL (ref 3.8–10.8)

## 2025-08-28 PROCEDURE — 93000 ELECTROCARDIOGRAM COMPLETE: CPT | Performed by: NURSE PRACTITIONER

## 2025-08-28 PROCEDURE — 99214 OFFICE O/P EST MOD 30 MIN: CPT | Performed by: NURSE PRACTITIONER

## 2025-08-28 PROCEDURE — 3008F BODY MASS INDEX DOCD: CPT | Performed by: NURSE PRACTITIONER

## 2025-08-28 PROCEDURE — 1036F TOBACCO NON-USER: CPT | Performed by: NURSE PRACTITIONER

## 2025-08-28 PROCEDURE — 82962 GLUCOSE BLOOD TEST: CPT | Performed by: NURSE PRACTITIONER

## 2025-08-28 ASSESSMENT — PAIN DESCRIPTION - DESCRIPTORS
DESCRIPTORS: ACHING
DESCRIPTORS: HEADACHE
DESCRIPTORS: ACHING

## 2025-08-28 ASSESSMENT — PAIN DESCRIPTION - LOCATION: LOCATION: CHEST

## 2025-08-28 ASSESSMENT — PAIN - FUNCTIONAL ASSESSMENT: PAIN_FUNCTIONAL_ASSESSMENT: 0-10

## 2025-08-28 ASSESSMENT — PAIN SCALES - GENERAL
PAINLEVEL_OUTOF10: 3
PAINLEVEL_OUTOF10: 7
PAINLEVEL_OUTOF10: 3

## 2025-08-28 ASSESSMENT — PATIENT HEALTH QUESTIONNAIRE - PHQ9: 1. LITTLE INTEREST OR PLEASURE IN DOING THINGS: NOT AT ALL

## 2025-08-28 ASSESSMENT — PAIN DESCRIPTION - ORIENTATION: ORIENTATION: MID

## 2025-08-28 ASSESSMENT — PAIN DESCRIPTION - ONSET: ONSET: ONGOING

## 2025-08-28 ASSESSMENT — PAIN DESCRIPTION - FREQUENCY: FREQUENCY: CONSTANT/CONTINUOUS

## 2025-08-28 ASSESSMENT — PAIN DESCRIPTION - PAIN TYPE: TYPE: ACUTE PAIN

## 2025-08-28 ASSESSMENT — PAIN DESCRIPTION - PROGRESSION: CLINICAL_PROGRESSION: NOT CHANGED

## 2025-08-29 ENCOUNTER — HOSPITAL ENCOUNTER (OUTPATIENT)
Dept: RADIOLOGY | Facility: CLINIC | Age: 30
Discharge: HOME | End: 2025-08-29
Payer: COMMERCIAL

## 2025-08-29 ENCOUNTER — OFFICE VISIT (OUTPATIENT)
Dept: PRIMARY CARE | Facility: CLINIC | Age: 30
End: 2025-08-29
Payer: COMMERCIAL

## 2025-08-29 DIAGNOSIS — R51.9 NONINTRACTABLE HEADACHE, UNSPECIFIED CHRONICITY PATTERN, UNSPECIFIED HEADACHE TYPE: ICD-10-CM

## 2025-08-29 PROCEDURE — 70450 CT HEAD/BRAIN W/O DYE: CPT

## 2025-08-29 ASSESSMENT — PATIENT HEALTH QUESTIONNAIRE - PHQ9
2. FEELING DOWN, DEPRESSED OR HOPELESS: NOT AT ALL
1. LITTLE INTEREST OR PLEASURE IN DOING THINGS: NOT AT ALL
SUM OF ALL RESPONSES TO PHQ9 QUESTIONS 1 AND 2: 0

## 2025-08-29 ASSESSMENT — PAIN SCALES - GENERAL: PAINLEVEL_OUTOF10: 7

## 2025-09-15 ENCOUNTER — APPOINTMENT (OUTPATIENT)
Dept: OBSTETRICS AND GYNECOLOGY | Facility: CLINIC | Age: 30
End: 2025-09-15
Payer: COMMERCIAL

## 2025-09-16 ENCOUNTER — APPOINTMENT (OUTPATIENT)
Dept: OBSTETRICS AND GYNECOLOGY | Facility: CLINIC | Age: 30
End: 2025-09-16
Payer: COMMERCIAL

## 2026-07-31 ENCOUNTER — APPOINTMENT (OUTPATIENT)
Dept: PRIMARY CARE | Facility: CLINIC | Age: 31
End: 2026-07-31
Payer: COMMERCIAL

## 2026-08-11 ENCOUNTER — APPOINTMENT (OUTPATIENT)
Dept: CARDIOLOGY | Facility: CLINIC | Age: 31
End: 2026-08-11
Payer: COMMERCIAL

## (undated) DEVICE — CATHETER, EP QUADRIPOLAR, IBI, 6FR X 110CM, STEERABLE, 2-5-2MM, LARGE CURVE

## (undated) DEVICE — INTRODUCER, SHEATH, FAST-CATH, 8FR X 12CM, C-LOCK

## (undated) DEVICE — INTRODUCER, SHEATH, FAST-CATH, 7FR X 12CM, C-LOCK

## (undated) DEVICE — INTRODUCER, SHEATH, FAST-CATH, 6 FR X 23 CM

## (undated) DEVICE — CATHETER, ABLATION, SAFIRE, 7FR/4MM, LRG CURL, BI-DIRECTIONAL

## (undated) DEVICE — ELECTRODE KIT, ENSITE X EP SYSTEM SURFACE

## (undated) DEVICE — GUIDEWIRE, ANGLE TIP,  .035 DIA, 180 CM, 3 CM TIP"

## (undated) DEVICE — SHIELD, PERIPHERAL, RADPAD, 11 X 34IN, W/ ABSORBENT, ORANGE, STERILE

## (undated) DEVICE — BANDAGE, QUIKCLOT, INTERVENTIONAL HEMO, W/O SLIT

## (undated) DEVICE — CATHETER, INQUIRY, 6FR X 110CM, 2-5-2MM SPACING, 1MM TIP, LG CURVE STEERABLE